# Patient Record
Sex: FEMALE | Race: BLACK OR AFRICAN AMERICAN | NOT HISPANIC OR LATINO | URBAN - METROPOLITAN AREA
[De-identification: names, ages, dates, MRNs, and addresses within clinical notes are randomized per-mention and may not be internally consistent; named-entity substitution may affect disease eponyms.]

---

## 2018-05-01 ENCOUNTER — OUTPATIENT (OUTPATIENT)
Dept: OUTPATIENT SERVICES | Facility: HOSPITAL | Age: 53
LOS: 1 days | End: 2018-05-01
Payer: MEDICAID

## 2018-05-01 PROCEDURE — G9001: CPT

## 2018-05-14 ENCOUNTER — INPATIENT (INPATIENT)
Facility: HOSPITAL | Age: 53
LOS: 1 days | Discharge: HOME | DRG: 92 | End: 2018-05-16
Attending: PSYCHIATRY & NEUROLOGY | Admitting: PSYCHIATRY & NEUROLOGY
Payer: MEDICAID

## 2018-05-14 ENCOUNTER — TRANSCRIPTION ENCOUNTER (OUTPATIENT)
Age: 53
End: 2018-05-14

## 2018-05-14 ENCOUNTER — EMERGENCY (EMERGENCY)
Facility: HOSPITAL | Age: 53
LOS: 1 days | End: 2018-05-14
Attending: EMERGENCY MEDICINE
Payer: MEDICAID

## 2018-05-14 VITALS
HEART RATE: 73 BPM | TEMPERATURE: 99 F | SYSTOLIC BLOOD PRESSURE: 113 MMHG | RESPIRATION RATE: 16 BRPM | OXYGEN SATURATION: 100 % | DIASTOLIC BLOOD PRESSURE: 56 MMHG

## 2018-05-14 VITALS
DIASTOLIC BLOOD PRESSURE: 60 MMHG | OXYGEN SATURATION: 100 % | HEIGHT: 62 IN | HEART RATE: 62 BPM | SYSTOLIC BLOOD PRESSURE: 130 MMHG | TEMPERATURE: 98 F | RESPIRATION RATE: 14 BRPM | WEIGHT: 169.09 LBS

## 2018-05-14 VITALS
WEIGHT: 162.26 LBS | RESPIRATION RATE: 17 BRPM | HEART RATE: 64 BPM | OXYGEN SATURATION: 100 % | SYSTOLIC BLOOD PRESSURE: 128 MMHG | DIASTOLIC BLOOD PRESSURE: 60 MMHG

## 2018-05-14 DIAGNOSIS — Z90.710 ACQUIRED ABSENCE OF BOTH CERVIX AND UTERUS: Chronic | ICD-10-CM

## 2018-05-14 DIAGNOSIS — R51 HEADACHE: ICD-10-CM

## 2018-05-14 LAB
ALBUMIN SERPL ELPH-MCNC: 3.7 G/DL — SIGNIFICANT CHANGE UP (ref 3.3–5)
ALBUMIN SERPL ELPH-MCNC: 4.2 G/DL — SIGNIFICANT CHANGE UP (ref 3.3–5)
ALP SERPL-CCNC: 63 U/L — SIGNIFICANT CHANGE UP (ref 40–120)
ALP SERPL-CCNC: 75 U/L — SIGNIFICANT CHANGE UP (ref 40–120)
ALT FLD-CCNC: 15 U/L — SIGNIFICANT CHANGE UP (ref 10–45)
ALT FLD-CCNC: 20 U/L — SIGNIFICANT CHANGE UP (ref 12–78)
ANION GAP SERPL CALC-SCNC: 13 MMOL/L — SIGNIFICANT CHANGE UP (ref 5–17)
ANION GAP SERPL CALC-SCNC: 9 MMOL/L — SIGNIFICANT CHANGE UP (ref 5–17)
APTT BLD: 25.7 SEC — LOW (ref 27.5–37.4)
APTT BLD: 34.1 SEC — SIGNIFICANT CHANGE UP (ref 27.5–37.4)
AST SERPL-CCNC: 12 U/L — LOW (ref 15–37)
AST SERPL-CCNC: 22 U/L — SIGNIFICANT CHANGE UP (ref 10–40)
BASOPHILS # BLD AUTO: 0.1 K/UL — SIGNIFICANT CHANGE UP (ref 0–0.2)
BASOPHILS NFR BLD AUTO: 1.3 % — SIGNIFICANT CHANGE UP (ref 0–2)
BILIRUB SERPL-MCNC: 0.5 MG/DL — SIGNIFICANT CHANGE UP (ref 0.2–1.2)
BILIRUB SERPL-MCNC: 0.6 MG/DL — SIGNIFICANT CHANGE UP (ref 0.2–1.2)
BLD GP AB SCN SERPL QL: NEGATIVE — SIGNIFICANT CHANGE UP
BUN SERPL-MCNC: 11 MG/DL — SIGNIFICANT CHANGE UP (ref 7–23)
BUN SERPL-MCNC: 9 MG/DL — SIGNIFICANT CHANGE UP (ref 7–23)
CALCIUM SERPL-MCNC: 8.6 MG/DL — SIGNIFICANT CHANGE UP (ref 8.5–10.1)
CALCIUM SERPL-MCNC: 9 MG/DL — SIGNIFICANT CHANGE UP (ref 8.4–10.5)
CHLORIDE SERPL-SCNC: 103 MMOL/L — SIGNIFICANT CHANGE UP (ref 96–108)
CHLORIDE SERPL-SCNC: 104 MMOL/L — SIGNIFICANT CHANGE UP (ref 96–108)
CO2 SERPL-SCNC: 22 MMOL/L — SIGNIFICANT CHANGE UP (ref 22–31)
CO2 SERPL-SCNC: 27 MMOL/L — SIGNIFICANT CHANGE UP (ref 22–31)
CREAT SERPL-MCNC: 0.66 MG/DL — SIGNIFICANT CHANGE UP (ref 0.5–1.3)
CREAT SERPL-MCNC: 0.76 MG/DL — SIGNIFICANT CHANGE UP (ref 0.5–1.3)
EOSINOPHIL # BLD AUTO: 0 K/UL — SIGNIFICANT CHANGE UP (ref 0–0.5)
EOSINOPHIL NFR BLD AUTO: 0 % — SIGNIFICANT CHANGE UP (ref 0–6)
ERYTHROCYTE [SEDIMENTATION RATE] IN BLOOD: 10 MM/HR — SIGNIFICANT CHANGE UP (ref 0–20)
GLUCOSE SERPL-MCNC: 128 MG/DL — HIGH (ref 70–99)
GLUCOSE SERPL-MCNC: 149 MG/DL — HIGH (ref 70–99)
HCT VFR BLD CALC: 39.3 % — SIGNIFICANT CHANGE UP (ref 34.5–45)
HCT VFR BLD CALC: 39.7 % — SIGNIFICANT CHANGE UP (ref 34.5–45)
HGB BLD-MCNC: 13 G/DL — SIGNIFICANT CHANGE UP (ref 11.5–15.5)
HGB BLD-MCNC: 13.2 G/DL — SIGNIFICANT CHANGE UP (ref 11.5–15.5)
INR BLD: 1.24 RATIO — HIGH (ref 0.88–1.16)
INR BLD: 1.96 RATIO — HIGH (ref 0.88–1.16)
LYMPHOCYTES # BLD AUTO: 1.2 K/UL — SIGNIFICANT CHANGE UP (ref 1–3.3)
LYMPHOCYTES # BLD AUTO: 15 % — SIGNIFICANT CHANGE UP (ref 13–44)
MCHC RBC-ENTMCNC: 29.8 PG — SIGNIFICANT CHANGE UP (ref 27–34)
MCHC RBC-ENTMCNC: 31 PG — SIGNIFICANT CHANGE UP (ref 27–34)
MCHC RBC-ENTMCNC: 33.2 GM/DL — SIGNIFICANT CHANGE UP (ref 32–36)
MCHC RBC-ENTMCNC: 33.3 GM/DL — SIGNIFICANT CHANGE UP (ref 32–36)
MCV RBC AUTO: 90 FL — SIGNIFICANT CHANGE UP (ref 80–100)
MCV RBC AUTO: 93.1 FL — SIGNIFICANT CHANGE UP (ref 80–100)
MONOCYTES # BLD AUTO: 0.3 K/UL — SIGNIFICANT CHANGE UP (ref 0–0.9)
MONOCYTES NFR BLD AUTO: 3.7 % — SIGNIFICANT CHANGE UP (ref 1–9)
NEUTROPHILS # BLD AUTO: 6.5 K/UL — SIGNIFICANT CHANGE UP (ref 1.8–7.4)
NEUTROPHILS NFR BLD AUTO: 80 % — HIGH (ref 43–77)
PLATELET # BLD AUTO: 251 K/UL — SIGNIFICANT CHANGE UP (ref 150–400)
PLATELET # BLD AUTO: 251 K/UL — SIGNIFICANT CHANGE UP (ref 150–400)
POTASSIUM SERPL-MCNC: 4 MMOL/L — SIGNIFICANT CHANGE UP (ref 3.5–5.3)
POTASSIUM SERPL-MCNC: 4.3 MMOL/L — SIGNIFICANT CHANGE UP (ref 3.5–5.3)
POTASSIUM SERPL-SCNC: 4 MMOL/L — SIGNIFICANT CHANGE UP (ref 3.5–5.3)
POTASSIUM SERPL-SCNC: 4.3 MMOL/L — SIGNIFICANT CHANGE UP (ref 3.5–5.3)
PROT SERPL-MCNC: 7.3 G/DL — SIGNIFICANT CHANGE UP (ref 6–8.3)
PROT SERPL-MCNC: 7.7 G/DL — SIGNIFICANT CHANGE UP (ref 6–8.3)
PROTHROM AB SERPL-ACNC: 13.6 SEC — HIGH (ref 9.8–12.7)
PROTHROM AB SERPL-ACNC: 21.7 SEC — HIGH (ref 9.8–12.7)
RBC # BLD: 4.27 M/UL — SIGNIFICANT CHANGE UP (ref 3.8–5.2)
RBC # BLD: 4.37 M/UL — SIGNIFICANT CHANGE UP (ref 3.8–5.2)
RBC # FLD: 12.1 % — SIGNIFICANT CHANGE UP (ref 10.3–14.5)
RBC # FLD: 12.5 % — SIGNIFICANT CHANGE UP (ref 10.3–14.5)
RH IG SCN BLD-IMP: POSITIVE — SIGNIFICANT CHANGE UP
SODIUM SERPL-SCNC: 138 MMOL/L — SIGNIFICANT CHANGE UP (ref 135–145)
SODIUM SERPL-SCNC: 140 MMOL/L — SIGNIFICANT CHANGE UP (ref 135–145)
WBC # BLD: 8.1 K/UL — SIGNIFICANT CHANGE UP (ref 3.8–10.5)
WBC # BLD: 8.6 K/UL — SIGNIFICANT CHANGE UP (ref 3.8–10.5)
WBC # FLD AUTO: 8.1 K/UL — SIGNIFICANT CHANGE UP (ref 3.8–10.5)
WBC # FLD AUTO: 8.6 K/UL — SIGNIFICANT CHANGE UP (ref 3.8–10.5)

## 2018-05-14 PROCEDURE — 85652 RBC SED RATE AUTOMATED: CPT

## 2018-05-14 PROCEDURE — 99284 EMERGENCY DEPT VISIT MOD MDM: CPT

## 2018-05-14 PROCEDURE — 70450 CT HEAD/BRAIN W/O DYE: CPT | Mod: 26

## 2018-05-14 PROCEDURE — 70450 CT HEAD/BRAIN W/O DYE: CPT

## 2018-05-14 PROCEDURE — 85027 COMPLETE CBC AUTOMATED: CPT

## 2018-05-14 PROCEDURE — 99285 EMERGENCY DEPT VISIT HI MDM: CPT

## 2018-05-14 PROCEDURE — 80053 COMPREHEN METABOLIC PANEL: CPT

## 2018-05-14 PROCEDURE — 99285 EMERGENCY DEPT VISIT HI MDM: CPT | Mod: 25

## 2018-05-14 PROCEDURE — 96360 HYDRATION IV INFUSION INIT: CPT

## 2018-05-14 RX ORDER — METOCLOPRAMIDE HCL 10 MG
10 TABLET ORAL ONCE
Qty: 0 | Refills: 0 | Status: COMPLETED | OUTPATIENT
Start: 2018-05-14 | End: 2018-05-14

## 2018-05-14 RX ORDER — ENOXAPARIN SODIUM 100 MG/ML
70 INJECTION SUBCUTANEOUS
Qty: 0 | Refills: 0 | Status: DISCONTINUED | OUTPATIENT
Start: 2018-05-15 | End: 2018-05-16

## 2018-05-14 RX ORDER — ENOXAPARIN SODIUM 100 MG/ML
70 INJECTION SUBCUTANEOUS ONCE
Qty: 0 | Refills: 0 | Status: COMPLETED | OUTPATIENT
Start: 2018-05-14 | End: 2018-05-14

## 2018-05-14 RX ORDER — SODIUM CHLORIDE 9 MG/ML
1000 INJECTION INTRAMUSCULAR; INTRAVENOUS; SUBCUTANEOUS ONCE
Qty: 0 | Refills: 0 | Status: COMPLETED | OUTPATIENT
Start: 2018-05-14 | End: 2018-05-14

## 2018-05-14 RX ORDER — SODIUM CHLORIDE 9 MG/ML
3 INJECTION INTRAMUSCULAR; INTRAVENOUS; SUBCUTANEOUS ONCE
Qty: 0 | Refills: 0 | Status: COMPLETED | OUTPATIENT
Start: 2018-05-14 | End: 2018-05-14

## 2018-05-14 RX ORDER — ACETAMINOPHEN 500 MG
1000 TABLET ORAL ONCE
Qty: 0 | Refills: 0 | Status: COMPLETED | OUTPATIENT
Start: 2018-05-14 | End: 2018-05-14

## 2018-05-14 RX ADMIN — Medication 10 MILLIGRAM(S): at 15:02

## 2018-05-14 RX ADMIN — SODIUM CHLORIDE 1000 MILLILITER(S): 9 INJECTION INTRAMUSCULAR; INTRAVENOUS; SUBCUTANEOUS at 11:16

## 2018-05-14 RX ADMIN — Medication 400 MILLIGRAM(S): at 15:01

## 2018-05-14 RX ADMIN — Medication 1000 MILLIGRAM(S): at 15:02

## 2018-05-14 RX ADMIN — SODIUM CHLORIDE 3 MILLILITER(S): 9 INJECTION INTRAMUSCULAR; INTRAVENOUS; SUBCUTANEOUS at 11:15

## 2018-05-14 RX ADMIN — ENOXAPARIN SODIUM 70 MILLIGRAM(S): 100 INJECTION SUBCUTANEOUS at 18:18

## 2018-05-14 NOTE — ED PROVIDER NOTE - PROGRESS NOTE DETAILS
Dw pt, doing well, awake, alert - still neuro intact.   Jonathan [facility] - Dr Mejia Neurosurg, Dr Mayfield ED - accepts [facility]

## 2018-05-14 NOTE — ED PROVIDER NOTE - ATTENDING CONTRIBUTION TO CARE
Pt transferred from hospitals ER for headache for one day with DVST seen on CTH.  No focal neuro deficit, no trauma, appears well.

## 2018-05-14 NOTE — ED PROVIDER NOTE - OBJECTIVE STATEMENT
54 yo F p/w gen headache x past ~ 2 days. Frontal discomcort, assoc with some nausea. No vomiting . No fever/chills. No recent illness. no cough /sputum / nasal congestion,. No recent trauma. no neck / back pain or stiffness. No agg/allev factors. No rad of pain. NO agg/allev factors. No visual changes. no other inj or co.

## 2018-05-14 NOTE — ED ADULT NURSE REASSESSMENT NOTE - NS ED NURSE REASSESS COMMENT FT1
Pt head CT resulted in posterior bleed. Pt being transferred to [place], handoff report given to Jarrod INIGUEZ at the transfer center. Pt is AxOx4 with no neuro deficits, awaiting EMS for transfer

## 2018-05-14 NOTE — ED ADULT NURSE REASSESSMENT NOTE - NS ED NURSE REASSESS COMMENT FT1
neuro check sone- no deficits noted, full rom x4 + strength in all 4 extremities no drift no facial deficits no slurred speech.  PERRLA pt denies any blurry vision, denies any photophobia. denies any nausea, vomiting, respiration even unlabored. plan of care explained to patient. will continue to monitor for safety and to re-assess. safety and support provided. neuro check done- no deficits noted, full rom x4 + strength in all 4 extremities no drift no facial deficits no slurred speech.  PERRLA pt denies any blurry vision, denies any photophobia. denies any nausea, vomiting, respiration even unlabored. plan of care explained to patient. will continue to monitor for safety and to re-assess. safety and support provided.

## 2018-05-14 NOTE — ED ADULT NURSE REASSESSMENT NOTE - NS ED NURSE REASSESS COMMENT FT1
19:10. Report received from HIRA Carey. Pt AAOx4, NAD, resp nonlabored, skin warm/dry, resting comfortably in bed. A&Ox3. PERRL. No facial droop noted. No slurred speech. Pt upper and lower extremities bilateral in strength. Pt denies dizziness, blurred vision, weakness, numbness/tingling, chest pain, SOB. n/v, abdominal pain, fevers, & chills. Pt awaiting bed upstairs. Safety & comfort measures maintained. Will continue to reassess.
A&Ox3. PERRL. No facial droop noted. No slurred speech. Pt upper and lower extremities bilateral in strength. Pt denies dizziness, blurred vision, weakness, numbness/tingling, chest pain, SOB. n/v, abdominal pain, fevers, & chills. Report given to HIRA Lake, in ED holding. Pt awaiting bed upstairs at this time.

## 2018-05-14 NOTE — H&P ADULT - HISTORY OF PRESENT ILLNESS
53F no significant PMH transferred from [place] for subdural hematoma vs. sinus thrombosis. Patient states she developed frontal headache associated with nausea/vomiting yesterday. She took acetaminophen without relief. CT head at [facility] showing "acute posterior interhemispheric and left occipital convexity subdural hematoma vs. sinus thrombosis. Her NIHSS is 0 and MRS is 0. She denied weakness, numbness, change in speech and vision, dizziness. She denied photophobia/phonophobia.

## 2018-05-14 NOTE — ED PROVIDER NOTE - ENMT, MLM
Airway patent, Nasal mucosa clear. Mouth with normal mucosa. Throat has no vesicles, no oropharyngeal exudates and uvula is midline. Neck supple. no meningeal signs.

## 2018-05-14 NOTE — ED ADULT NURSE NOTE - CHPI ED SYMPTOMS NEG
no confusion/no change in level of consciousness/no numbness/no fever/no loss of consciousness/no blurred vision/no weakness

## 2018-05-14 NOTE — ED PROVIDER NOTE - CHPI ED SYMPTOMS NEG
no confusion/no vomiting/no dizziness/no numbness/no fever/no loss of consciousness/no change in level of consciousness/no weakness/no blurred vision

## 2018-05-14 NOTE — ED PROVIDER NOTE - OBJECTIVE STATEMENT
53F no significant PMH transferred from [place] for subdural hematoma vs. sinus thrombosis. Patient states she developed frontal headache associated with nausea/vomiting yesterday. She took acetaminophen every 6 hours without relief of pain, she then went to urgent care, who sent her to ED for further eval. CT head at [facility] showing "acute posterior interhemispheric and left occipital convexity subdural hematoma vs. sinus thrombosis". Patient transferred to [facility] for neurosurgical eval.     PCP: Keiko

## 2018-05-14 NOTE — ED PROVIDER NOTE - MEDICAL DECISION MAKING DETAILS
Resident Catrina: 53F no sig PMH p/w headache transferred from [place] for r/o sinus thrombosis vs. SDH. Will get CT head w/ IV contrast per neurosurgical recs.

## 2018-05-14 NOTE — ED ADULT NURSE NOTE - OBJECTIVE STATEMENT
53 yr old female was having a bad headache yesterday that wasn't going away. she went to urgent care who sent her to the ER and they showed either a sdh or maybe a clot in the brain. on assessment a and o x 3 lungs clear abd soft non tender no swelling in extremities no n/v/d/ no fevers, no neuro deficits .can walk

## 2018-05-14 NOTE — H&P ADULT - ATTENDING COMMENTS
I have seen and examined this patient with the stroke neurology team.     History was reviewed with the patient.   ROS: All negative except documented in the HPI.   Neurological exam was performed and agree with exam as documented above.   Laboratory results and imaging studies were reviewed by me.   I agree with the neurology resident note as documented above.    54 Y/O woman with vascular risk factor of age is evaluated at [facility] for headaches. She reports to have noticed HAs since about 5/10, described as holocephalic HAs associated with nausea and vomiting. She denies any significant history of HAs in the past. She was initially presented to urgent care center and was referred to OSH for further evaluation. She was transferred to [facility] for further management of abnormal CT brain findings. Neurological exam today is grossly non-focal. CT brain on admission showed hyperdensity involving left SSS, transverse sinus, sigmoid sinus, IJ and straight sinus. CTV head confirmed extensive cerebral venous sinus thrombosis involving posterior superior sagittal sinus, torcula, bilateral transverse sinuses (left worse than right), sigmoid sinus, IJ and straight sinus. Of note, she denies any personal or family history of DVT/PEs or history of miscarriages in the past.     Impression:  Spontaneous/unprovoked extensive cerebral venous thrombosis    Plan:  - Continue with therapeutic anticoagulation with subcutaneous Lovenox and consider switching to warfarin with goal INR 2-3 as outpatient. Duration of therapeutic indicative patient is expected to be 6 months considering unprovoked cerebral venous sinus thrombosis  - Hypercoagulable workup; hematologist evaluation as outpatient  - Mixing studies due to elevated INR on admission  - BP goal<140/90 mmHg  - MRI of brain with and without contrast   - Repeat MRI brain/MRV head with and without contrast in about 6 months  - PT/OT/speech and swallowing evaluation    Above-mentioned plan is discussed with patient in detail. All the questions were answered and concerns were addressed.

## 2018-05-14 NOTE — CONSULT NOTE ADULT - ASSESSMENT
53F no signifincat pmhx not on any medications here with one days of headache n/v found to have SDH vs venous sinus thrombosis on CTH  - No acute neurosurgical intervention  - Keppra 500BID, load with 1g  - CTH / CTV  - Neurology eval if positive for venous sinus thrombosis   - Neurochecks

## 2018-05-14 NOTE — H&P ADULT - NSHPLABSRESULTS_GEN_ALL_CORE
13.2   8.6   )-----------( 251      ( 14 May 2018 15:35 )             39.7   05-14    138  |  103  |  9   ----------------------------<  128<H>  4.3   |  22  |  0.66    Ca    9.0      14 May 2018 15:35    TPro  7.3  /  Alb  4.2  /  TBili  0.5  /  DBili  x   /  AST  22  /  ALT  15  /  AlkPhos  63  05-14

## 2018-05-14 NOTE — H&P ADULT - ASSESSMENT
53F no significant PMH transferred from [place] for subdural hematoma vs. sinus thrombosis. Patient states she developed frontal headache associated with nausea/vomiting yesterday. She took acetaminophen without relief. CT head at [facility] showing "acute posterior interhemispheric and left occipital convexity subdural hematoma vs. sinus thrombosis. Her NIHSS is 0 and MRS is 0. She denied weakness, numbness, change in speech and vision, dizziness. She denied photophobia/phonophobia. Neurological examination was unremarkable.     Impression 53F no significant PMH transferred from [place] for subdural hematoma vs. sinus thrombosis. Patient states she developed frontal headache associated with nausea/vomiting yesterday. She took acetaminophen without relief. CT head at [facility] showing "acute posterior interhemispheric and left occipital convexity subdural hematoma vs. sinus thrombosis. Her NIHSS is 0 and MRS is 0. She denied weakness, numbness, change in speech and vision, dizziness. She denied photophobia/phonophobia. Neurological examination was unremarkable.     Impression     Extensive venous sinus thrombosis     Plan     Enoxaparin 70 BID (therapeutic dose)   vitals monitoring   neuro checks   PT   coumadin would be started based on patient's preference and insurance coverage tomorrow

## 2018-05-14 NOTE — ED PROVIDER NOTE - PROGRESS NOTE DETAILS
Resident Catrina: Neurosurgery resident at bedside, already reviewed CTH from [facility], requesting CTH w/ IV contrast to r/o thrombosis. Dr. Hernández Note: neuro consulted, awaiting ct venogram, will hold treatment til imaging results and neuro reccs.  Also noted was elevated INR, will repeat.

## 2018-05-15 DIAGNOSIS — R69 ILLNESS, UNSPECIFIED: ICD-10-CM

## 2018-05-15 LAB
APTT BLD: 32.3 SEC — SIGNIFICANT CHANGE UP (ref 27.5–37.4)
ESTIMATED AVERAGE GLUCOSE: 120 MG/DL — HIGH (ref 68–114)
FIBRINOGEN PPP-MCNC: 434 MG/DL — SIGNIFICANT CHANGE UP (ref 310–510)
HBA1C BLD-MCNC: 5.8 % — HIGH (ref 4–5.6)
HBA1C BLD-MCNC: 5.8 % — HIGH (ref 4–5.6)
INR BLD: 1.19 RATIO — HIGH (ref 0.88–1.16)
PROTHROM AB SERPL-ACNC: 12.9 SEC — HIGH (ref 9.8–12.7)
PT 100%: 12.9 SEC — HIGH (ref 9.8–12.7)
PT 50/50: 12.3 SEC — SIGNIFICANT CHANGE UP (ref 9.7–15.2)
REPTILASE TIME: 18.4 SEC — SIGNIFICANT CHANGE UP (ref 15–20.5)
THROMBIN TIME: 26.5 SECS — HIGH (ref 16–25)

## 2018-05-15 PROCEDURE — 99223 1ST HOSP IP/OBS HIGH 75: CPT | Mod: GC

## 2018-05-15 PROCEDURE — G0452: CPT | Mod: 26

## 2018-05-15 RX ORDER — SODIUM CHLORIDE 9 MG/ML
1000 INJECTION INTRAMUSCULAR; INTRAVENOUS; SUBCUTANEOUS
Qty: 0 | Refills: 0 | Status: DISCONTINUED | OUTPATIENT
Start: 2018-05-15 | End: 2018-05-16

## 2018-05-15 RX ORDER — ACETAMINOPHEN 500 MG
650 TABLET ORAL EVERY 6 HOURS
Qty: 0 | Refills: 0 | Status: DISCONTINUED | OUTPATIENT
Start: 2018-05-15 | End: 2018-05-16

## 2018-05-15 RX ADMIN — ENOXAPARIN SODIUM 70 MILLIGRAM(S): 100 INJECTION SUBCUTANEOUS at 05:16

## 2018-05-15 RX ADMIN — Medication 650 MILLIGRAM(S): at 17:35

## 2018-05-15 RX ADMIN — ENOXAPARIN SODIUM 70 MILLIGRAM(S): 100 INJECTION SUBCUTANEOUS at 17:33

## 2018-05-16 ENCOUNTER — TRANSCRIPTION ENCOUNTER (OUTPATIENT)
Age: 53
End: 2018-05-16

## 2018-05-16 VITALS — HEART RATE: 56 BPM | SYSTOLIC BLOOD PRESSURE: 118 MMHG | DIASTOLIC BLOOD PRESSURE: 64 MMHG | OXYGEN SATURATION: 97 %

## 2018-05-16 DIAGNOSIS — E87.6 HYPOKALEMIA: ICD-10-CM

## 2018-05-16 DIAGNOSIS — Q21.1 ATRIAL SEPTAL DEFECT: ICD-10-CM

## 2018-05-16 LAB
ANION GAP SERPL CALC-SCNC: 14 MMOL/L — SIGNIFICANT CHANGE UP (ref 5–17)
APTT BLD: 29.3 SEC — SIGNIFICANT CHANGE UP (ref 27.5–37.4)
AT III ACT/NOR PPP CHRO: 93 % — SIGNIFICANT CHANGE UP (ref 85–135)
B2 GLYCOPROT1 AB SER QL: NEGATIVE — SIGNIFICANT CHANGE UP
BUN SERPL-MCNC: 8 MG/DL — SIGNIFICANT CHANGE UP (ref 7–23)
CALCIUM SERPL-MCNC: 8.3 MG/DL — LOW (ref 8.4–10.5)
CARDIOLIPIN AB SER-ACNC: NEGATIVE — SIGNIFICANT CHANGE UP
CHLORIDE SERPL-SCNC: 102 MMOL/L — SIGNIFICANT CHANGE UP (ref 96–108)
CHOLEST SERPL-MCNC: 150 MG/DL — SIGNIFICANT CHANGE UP (ref 10–199)
CO2 SERPL-SCNC: 26 MMOL/L — SIGNIFICANT CHANGE UP (ref 22–31)
CREAT SERPL-MCNC: 0.63 MG/DL — SIGNIFICANT CHANGE UP (ref 0.5–1.3)
GLUCOSE SERPL-MCNC: 116 MG/DL — HIGH (ref 70–99)
HCG SERPL-ACNC: <2 MIU/ML — LOW (ref 5–24)
HCT VFR BLD CALC: 37.3 % — SIGNIFICANT CHANGE UP (ref 34.5–45)
HCYS SERPL-MCNC: 6.5 UMOL/L — SIGNIFICANT CHANGE UP (ref 5–15)
HDLC SERPL-MCNC: 48 MG/DL — SIGNIFICANT CHANGE UP (ref 40–125)
HGB BLD-MCNC: 12.4 G/DL — SIGNIFICANT CHANGE UP (ref 11.5–15.5)
INR BLD: 1.09 RATIO — SIGNIFICANT CHANGE UP (ref 0.88–1.16)
LIPID PNL WITH DIRECT LDL SERPL: 80 MG/DL — SIGNIFICANT CHANGE UP
MCHC RBC-ENTMCNC: 30.1 PG — SIGNIFICANT CHANGE UP (ref 27–34)
MCHC RBC-ENTMCNC: 33.2 GM/DL — SIGNIFICANT CHANGE UP (ref 32–36)
MCV RBC AUTO: 90.5 FL — SIGNIFICANT CHANGE UP (ref 80–100)
PLATELET # BLD AUTO: 264 K/UL — SIGNIFICANT CHANGE UP (ref 150–400)
POTASSIUM SERPL-MCNC: 3.4 MMOL/L — LOW (ref 3.5–5.3)
POTASSIUM SERPL-SCNC: 3.4 MMOL/L — LOW (ref 3.5–5.3)
PROT C ACT/NOR PPP: 100 % — SIGNIFICANT CHANGE UP (ref 74–150)
PROT S FREE AG PPP IA-ACNC: 86 % — SIGNIFICANT CHANGE UP (ref 61–131)
PROTHROM AB SERPL-ACNC: 12.3 SEC — SIGNIFICANT CHANGE UP (ref 10–13.1)
RBC # BLD: 4.12 M/UL — SIGNIFICANT CHANGE UP (ref 3.8–5.2)
RBC # FLD: 13.4 % — SIGNIFICANT CHANGE UP (ref 10.3–14.5)
SODIUM SERPL-SCNC: 142 MMOL/L — SIGNIFICANT CHANGE UP (ref 135–145)
TOTAL CHOLESTEROL/HDL RATIO MEASUREMENT: 3.1 RATIO — LOW (ref 3.3–7.1)
TRIGL SERPL-MCNC: 110 MG/DL — SIGNIFICANT CHANGE UP (ref 10–149)
WBC # BLD: 6.04 K/UL — SIGNIFICANT CHANGE UP (ref 3.8–10.5)
WBC # FLD AUTO: 6.04 K/UL — SIGNIFICANT CHANGE UP (ref 3.8–10.5)

## 2018-05-16 PROCEDURE — 99233 SBSQ HOSP IP/OBS HIGH 50: CPT

## 2018-05-16 RX ORDER — SODIUM CHLORIDE 9 MG/ML
1000 INJECTION INTRAMUSCULAR; INTRAVENOUS; SUBCUTANEOUS
Qty: 0 | Refills: 0 | Status: DISCONTINUED | OUTPATIENT
Start: 2018-05-16 | End: 2018-05-16

## 2018-05-16 RX ORDER — ENOXAPARIN SODIUM 100 MG/ML
70 INJECTION SUBCUTANEOUS
Qty: 4200 | Refills: 0 | OUTPATIENT
Start: 2018-05-16 | End: 2018-06-14

## 2018-05-16 RX ORDER — RIBOFLAVIN (VITAMIN B2) 25 MG
1 TABLET ORAL
Qty: 30 | Refills: 0 | OUTPATIENT
Start: 2018-05-16 | End: 2018-06-14

## 2018-05-16 RX ORDER — POTASSIUM CHLORIDE 20 MEQ
40 PACKET (EA) ORAL ONCE
Qty: 0 | Refills: 0 | Status: COMPLETED | OUTPATIENT
Start: 2018-05-16 | End: 2018-05-16

## 2018-05-16 RX ORDER — RIBOFLAVIN (VITAMIN B2) 25 MG
1 TABLET ORAL
Qty: 30 | Refills: 0 | OUTPATIENT
Start: 2018-05-16

## 2018-05-16 RX ADMIN — Medication 650 MILLIGRAM(S): at 01:10

## 2018-05-16 RX ADMIN — Medication 40 MILLIEQUIVALENT(S): at 13:51

## 2018-05-16 RX ADMIN — Medication 650 MILLIGRAM(S): at 12:50

## 2018-05-16 RX ADMIN — Medication 650 MILLIGRAM(S): at 11:50

## 2018-05-16 RX ADMIN — ENOXAPARIN SODIUM 70 MILLIGRAM(S): 100 INJECTION SUBCUTANEOUS at 05:16

## 2018-05-16 NOTE — CONSULT NOTE ADULT - ASSESSMENT
53F no significant PMH transferred from [place] for subdural hematoma vs. sinus thrombosis. Patient states she developed frontal headache associated with nausea/vomiting yesterday. She took acetaminophen without relief. CT head at [facility] showing "acute posterior interhemispheric and left occipital convexity subdural hematoma vs. sinus thrombosis.

## 2018-05-16 NOTE — PROGRESS NOTE ADULT - SUBJECTIVE AND OBJECTIVE BOX
54 y/o Female with vascular risk factor of age is evaluated at [facility] for headaches. She reports to have noticed HAs since about 5/10, described as holocephalic HAs associated with nausea and vomiting. She denies any significant history of HAs in the past. She was transferred to [facility] for further management of abnormal CT brain findings.     SUBJECTIVE: No events overnight.  No new neurologic complaints.      acetaminophen   Tablet. 650 milliGRAM(s) Oral every 6 hours PRN  enoxaparin Injectable 70 milliGRAM(s) SubCutaneous two times a day  sodium chloride 0.9%. 1000 milliLiter(s) IV Continuous <Continuous>    PHYSICAL EXAM:   Vital Signs Last 24 Hrs  T(C): 36.9 (16 May 2018 08:18), Max: 37.2 (15 May 2018 23:15)  T(F): 98.4 (16 May 2018 08:18), Max: 99 (15 May 2018 23:15)  HR: 56 (16 May 2018 08:18) (52 - 63)  BP: 101/64 (16 May 2018 08:18) (101/64 - 127/80)  RR: 18 (16 May 2018 08:18) (17 - 18)  SpO2: 99% (16 May 2018 08:18) (97% - 99%)    General: No acute distress  HEENT: EOM intact, visual fields full  Abdomen: Soft, nontender, nondistended   Extremities: No edema    NEUROLOGICAL EXAM:  Mental status: Awake, alert, oriented x3, no aphasia, no neglect, normal memory   Cranial Nerves: No facial asymmetry, no nystagmus, no dysarthria,  tongue midline  Motor exam: Normal tone, no drift, 5/5 RUE, 5/5 RLE, 5/5 LUE, 5/5 LLE, normal fine finger movements.  Sensation: Intact to light touch   Coordination/ Gait: No dysmetria, LUIS intact and symmetric bilaterally    LABS:                        12.4   6.04  )-----------( 264      ( 16 May 2018 07:54 )             37.3    05-16    142  |  102  |  8   ----------------------------<  116<H>  3.4<L>   |  26  |  0.63    Ca    8.3<L>      16 May 2018 06:19    TPro  7.3  /  Alb  4.2  /  TBili  0.5  /  DBili  x   /  AST  22  /  ALT  15  /  AlkPhos  63  05-14  PT/INR - ( 16 May 2018 07:41 )   PT: 12.3 sec;   INR: 1.09 ratio         PTT - ( 16 May 2018 07:41 )  PTT:29.3 sec  Hemoglobin A1C, Whole Blood: 5.8 % (05-15 @ 16:24)  Hemoglobin A1C, Whole Blood: 5.8 % (05-15 @ 16:24)      IMAGING: Reviewed by me.     Brain CT 5/15/17  Extensive dural sinus thrombosis.    Brain CTA 5/15/17  Extensive dural sinus thrombosis involving the left transverse   and sigmoid sinuses, left jugular bulb, torcula, inferior aspect of the   superior sagittal sinus and straight sinus. 54 y/o Female with vascular risk factor of age is evaluated at [facility] for headaches. She reports to have noticed HAs since about 5/10, described as holocephalic HAs associated with nausea and vomiting. She denies any significant history of HAs in the past. She was transferred to [facility] for further management of abnormal CT brain findings.     SUBJECTIVE: No events overnight.  No new neurologic complaints.      acetaminophen   Tablet. 650 milliGRAM(s) Oral every 6 hours PRN  enoxaparin Injectable 70 milliGRAM(s) SubCutaneous two times a day  sodium chloride 0.9%. 1000 milliLiter(s) IV Continuous <Continuous>    PHYSICAL EXAM:   Vital Signs Last 24 Hrs  T(C): 36.9 (16 May 2018 08:18), Max: 37.2 (15 May 2018 23:15)  T(F): 98.4 (16 May 2018 08:18), Max: 99 (15 May 2018 23:15)  HR: 56 (16 May 2018 08:18) (52 - 63)  BP: 101/64 (16 May 2018 08:18) (101/64 - 127/80)  RR: 18 (16 May 2018 08:18) (17 - 18)  SpO2: 99% (16 May 2018 08:18) (97% - 99%)    General: No acute distress  HEENT: EOM intact, visual fields full  Abdomen: Soft, nontender, nondistended   Extremities: No edema    NEUROLOGICAL EXAM:  Mental status: Awake, alert, oriented x3, no aphasia, no neglect, normal memory   Cranial Nerves: No facial asymmetry, no nystagmus, no dysarthria,  tongue midline  Motor exam: Normal tone, no drift, 5/5 RUE, 5/5 RLE, 5/5 LUE, 5/5 LLE, normal fine finger movements.  Sensation: Intact to light touch   Coordination/ Gait: No dysmetria    LABS:                        12.4   6.04  )-----------( 264      ( 16 May 2018 07:54 )             37.3    05-16    142  |  102  |  8   ----------------------------<  116<H>  3.4<L>   |  26  |  0.63    Ca    8.3<L>      16 May 2018 06:19    TPro  7.3  /  Alb  4.2  /  TBili  0.5  /  DBili  x   /  AST  22  /  ALT  15  /  AlkPhos  63  05-14  PT/INR - ( 16 May 2018 07:41 )   PT: 12.3 sec;   INR: 1.09 ratio         PTT - ( 16 May 2018 07:41 )  PTT:29.3 sec  Hemoglobin A1C, Whole Blood: 5.8 % (05-15 @ 16:24)  Hemoglobin A1C, Whole Blood: 5.8 % (05-15 @ 16:24)      IMAGING: Reviewed by me.     Brain CT 5/15/17  Extensive dural sinus thrombosis.    Brain CTA 5/15/17  Extensive dural sinus thrombosis involving the left transverse   and sigmoid sinuses, left jugular bulb, torcula, inferior aspect of the   superior sagittal sinus and straight sinus. 54 y/o Female with vascular risk factor of age is evaluated at [facility] for headaches. She reports to have noticed HAs since about 5/10, described as holocephalic HAs associated with nausea and vomiting. She denies any significant history of HAs in the past. She was transferred to [facility] for further management of abnormal CT brain findings.     SUBJECTIVE: No events overnight.  No new neurologic complaints.      acetaminophen   Tablet. 650 milliGRAM(s) Oral every 6 hours PRN  enoxaparin Injectable 70 milliGRAM(s) SubCutaneous two times a day  sodium chloride 0.9%. 1000 milliLiter(s) IV Continuous <Continuous>    PHYSICAL EXAM:   Vital Signs Last 24 Hrs  T(C): 36.9 (16 May 2018 08:18), Max: 37.2 (15 May 2018 23:15)  T(F): 98.4 (16 May 2018 08:18), Max: 99 (15 May 2018 23:15)  HR: 56 (16 May 2018 08:18) (52 - 63)  BP: 101/64 (16 May 2018 08:18) (101/64 - 127/80)  RR: 18 (16 May 2018 08:18) (17 - 18)  SpO2: 99% (16 May 2018 08:18) (97% - 99%)    General: No acute distress  HEENT: EOM intact, visual fields full  Abdomen: Soft, nontender, nondistended   Extremities: No edema    NEUROLOGICAL EXAM:  Mental status: Awake, alert, oriented x3, normal memory, follows simple and complex commands.  Cranial Nerves: No facial asymmetry, no nystagmus, no dysarthria,  tongue midline  Motor exam: Normal tone, no drift, 5/5 RUE, 5/5 RLE, 5/5 LUE, 5/5 LLE, normal fine finger movements.  Sensation: Intact to light touch   Coordination/ Gait: No dysmetria    LABS:                        12.4   6.04  )-----------( 264      ( 16 May 2018 07:54 )             37.3    05-16    142  |  102  |  8   ----------------------------<  116<H>  3.4<L>   |  26  |  0.63    Ca    8.3<L>      16 May 2018 06:19    TPro  7.3  /  Alb  4.2  /  TBili  0.5  /  DBili  x   /  AST  22  /  ALT  15  /  AlkPhos  63  05-14  PT/INR - ( 16 May 2018 07:41 )   PT: 12.3 sec;   INR: 1.09 ratio         PTT - ( 16 May 2018 07:41 )  PTT:29.3 sec  Hemoglobin A1C, Whole Blood: 5.8 % (05-15 @ 16:24)  Hemoglobin A1C, Whole Blood: 5.8 % (05-15 @ 16:24)      IMAGING: Reviewed by me.     Brain CT 5/15/17  Extensive dural sinus thrombosis.    Brain CTA 5/15/17  Extensive dural sinus thrombosis involving the left transverse   and sigmoid sinuses, left jugular bulb, torcula, inferior aspect of the   superior sagittal sinus and straight sinus. 54 y/o Female with vascular risk factor of age evaluated at [facility] for headaches. She reported to have noticed HAs since about 5/10, described as holocephalic HAs associated with nausea and vomiting. She denied any significant history of HAs in the past. She was transferred to [facility] for further management of abnormal CT brain findings.     SUBJECTIVE: No events overnight.  No new neurologic complaints.      acetaminophen   Tablet. 650 milliGRAM(s) Oral every 6 hours PRN  enoxaparin Injectable 70 milliGRAM(s) SubCutaneous two times a day  sodium chloride 0.9%. 1000 milliLiter(s) IV Continuous <Continuous>    PHYSICAL EXAM:   Vital Signs Last 24 Hrs  T(C): 36.9 (16 May 2018 08:18), Max: 37.2 (15 May 2018 23:15)  T(F): 98.4 (16 May 2018 08:18), Max: 99 (15 May 2018 23:15)  HR: 56 (16 May 2018 08:18) (52 - 63)  BP: 101/64 (16 May 2018 08:18) (101/64 - 127/80)  RR: 18 (16 May 2018 08:18) (17 - 18)  SpO2: 99% (16 May 2018 08:18) (97% - 99%)    General: No acute distress  HEENT: EOM intact, visual fields full  Abdomen: Soft, nontender, nondistended   Extremities: No edema    NEUROLOGICAL EXAM:  Mental status: Awake, alert, oriented x3, normal memory, follows simple and complex commands.  Cranial Nerves: No facial asymmetry, no nystagmus, no dysarthria,  tongue midline  Motor exam: Normal tone, no drift, 5/5 RUE, 5/5 RLE, 5/5 LUE, 5/5 LLE, normal fine finger movements.  Sensation: Intact to light touch   Coordination/ Gait: No dysmetria    LABS:                        12.4   6.04  )-----------( 264      ( 16 May 2018 07:54 )             37.3    05-16    142  |  102  |  8   ----------------------------<  116<H>  3.4<L>   |  26  |  0.63    Ca    8.3<L>      16 May 2018 06:19    TPro  7.3  /  Alb  4.2  /  TBili  0.5  /  DBili  x   /  AST  22  /  ALT  15  /  AlkPhos  63  05-14  PT/INR - ( 16 May 2018 07:41 )   PT: 12.3 sec;   INR: 1.09 ratio         PTT - ( 16 May 2018 07:41 )  PTT:29.3 sec  Hemoglobin A1C, Whole Blood: 5.8 % (05-15 @ 16:24)  Hemoglobin A1C, Whole Blood: 5.8 % (05-15 @ 16:24)      IMAGING: Reviewed by me.     Brain CT 5/15/17  Extensive dural sinus thrombosis.    Brain CTA 5/15/17  Extensive dural sinus thrombosis involving the left transverse   and sigmoid sinuses, left jugular bulb, torcula, inferior aspect of the   superior sagittal sinus and straight sinus.

## 2018-05-16 NOTE — PHYSICAL THERAPY INITIAL EVALUATION ADULT - DISCHARGE DISPOSITION, PT EVAL
no skilled PT needs/Home with no skilled PT needs, no AD recommendation. Assist of pt's spouse as needed. **Pt cleared for d/c home from PT perspective at this time./home

## 2018-05-16 NOTE — DISCHARGE NOTE ADULT - CARE PROVIDER_API CALL
Ad Mcfarland (BAO), Neurology; Vascular Neurology  [address]  [address]  [place] [address]  Phone: [phone]  Fax: [phone]    Pahlavan, Mohsen (MD), Nephrology  [address]  [place] [address]  Phone: [phone]  Fax: [phone]

## 2018-05-16 NOTE — DISCHARGE NOTE ADULT - CARE PROVIDERS DIRECT ADDRESSES
[email or web address]

## 2018-05-16 NOTE — PHYSICAL THERAPY INITIAL EVALUATION ADULT - PERTINENT HX OF CURRENT PROBLEM, REHAB EVAL
53F no significant PMH transferred from [place] for subdural hematoma vs. sinus thrombosis. Patient states she developed frontal headache associated with nausea/vomiting yesterday. She took acetaminophen without relief. CT head at [facility] showing "acute posterior interhemispheric and left occipital convexity subdural hematoma vs. sinus thrombosis. 53F no significant PMH transferred from [place] for subdural hematoma vs. sinus thrombosis. Patient states she developed frontal headache associated with nausea/vomiting yesterday. She took acetaminophen without relief. CT head at [facility] showing "acute posterior interhemispheric and left occipital convexity subdural hematoma vs. sinus thrombosis. CT head (5/14): extensive dural sinus thrombosis

## 2018-05-16 NOTE — DISCHARGE NOTE ADULT - PLAN OF CARE
prevention of recurrence sinus venous thrombosis  full dose lovenox injection BID and then bridge to coumadin with your PCP  follow up with PCP and vascular neurology- Dr. Mcfarland

## 2018-05-16 NOTE — PHYSICAL THERAPY INITIAL EVALUATION ADULT - ORIENTATION, REHAB EVAL
QUICK REFERENCE INFORMATION:  The ABCs of the Annual Wellness Visit    Subsequent Medicare Wellness Visit    HEALTH RISK ASSESSMENT    1946    Recent Hospitalizations:  Recently treated at the following:  [facility]        Current Medical Providers:  Patient Care Team:  Krystina Gee MD as PCP - General  Krystina Gee MD as PCP - Family Medicine  Krystina Gee MD as PCP - Claims Attributed        Smoking Status:  History   Smoking Status   • Former Smoker   • Packs/day: 2.00   • Years: 35.00   • Types: Cigarettes   Smokeless Tobacco   • Never Used     Comment: QUIT 20 PLUS YEARS AGO        Alcohol Consumption:  History   Alcohol Use No     Comment: stopped drinking       Depression Screen:   PHQ-9 Depression Screening 4/28/2017   Little interest or pleasure in doing things 0   Feeling down, depressed, or hopeless 0   Trouble falling or staying asleep, or sleeping too much 0   Feeling tired or having little energy 0   Poor appetite or overeating 0   Feeling bad about yourself - or that you are a failure or have let yourself or your family down 0   Trouble concentrating on things, such as reading the newspaper or watching television 0   Moving or speaking so slowly that other people could have noticed. Or the opposite - being so fidgety or restless that you have been moving around a lot more than usual 0   Thoughts that you would be better off dead, or of hurting yourself in some way 0   PHQ-9 Total Score 0   If you checked off any problems, how difficult have these problems made it for you to do your work, take care of things at home, or get along with other people? Not difficult at all       Health Habits and Functional and Cognitive Screening:  Functional & Cognitive Status 4/28/2017   Do you have difficulty preparing food and eating? No   Do you have difficulty bathing yourself? No   Do you have difficulty getting dressed? No   Do you have difficulty moving around from place to place?  No   In the past year have you fallen or experienced a near fall? No   Do you need help using the phone?  No   Are you deaf or do you have serious difficulty hearing?  No   Do you need help with transportation? No   Do you need help shopping? No   Do you need help preparing meals?  No   Do you need help with housework?  No   Do you need help with laundry? No   Do you need help taking your medications? No   Do you need help managing money? No   Do you have difficulty concentrating, remembering or making decisions? No       Health Habits  Current Diet: Well Balanced Diet  Dental Exam: Up to date  Eye Exam: Up to date  Exercise (times per week): 0 times per week  Current Exercise Activities Include: None      Does the patient have evidence of cognitive impairment? No    Aspirin use counseling: Start ASA 81 mg daily       Recent Lab Results:  CMP:  Lab Results   Component Value Date    BUN 26 (H) 03/08/2017    CREATININE 1.30 03/08/2017    EGFRIFNONA 54 (L) 03/08/2017    BCR 20.0 03/08/2017     03/08/2017    K 4.4 03/08/2017    CO2 30.0 03/08/2017    CALCIUM 8.7 03/08/2017    ALBUMIN 4.40 12/14/2016    LABIL2 1.5 12/14/2016    BILITOT 0.4 12/14/2016    ALKPHOS 73 12/14/2016    AST 42 (H) 12/14/2016    ALT 41 (H) 12/14/2016     Lipid Panel:  Lab Results   Component Value Date    CHLPL 125 06/06/2016    TRIG 113 12/14/2016    HDL 39 (L) 12/14/2016     HbA1c:  Lab Results   Component Value Date    HGBA1C 7.60 (H) 02/22/2017       Visual Acuity:  No exam data present    Age-appropriate Screening Schedule:  Refer to the list below for future screening recommendations based on patient's age, sex and/or medical conditions. Orders for these recommended tests are listed in the plan section. The patient has been provided with a written plan.    Health Maintenance   Topic Date Due   • ZOSTER VACCINE  04/30/2018 (Originally 6/6/2016)   • HEMOGLOBIN A1C  08/22/2017   • DIABETIC EYE EXAM  11/08/2017   • LIPID PANEL  12/14/2017    • DIABETIC FOOT EXAM  04/28/2018   • URINE MICROALBUMIN  04/28/2018   • COLONOSCOPY  01/08/2024   • INFLUENZA VACCINE  Addressed   • PNEUMOCOCCAL VACCINES (65+ LOW/MEDIUM RISK)  Completed   • TDAP/TD VACCINES  Excluded        Subjective   History of Present Illness    Gabriela Mar is a 70 y.o. male who presents for an Subsequent Wellness Visit.    The following portions of the patient's history were reviewed and updated as appropriate: allergies, current medications, past family history, past medical history, past social history, past surgical history and problem list.    Outpatient Medications Prior to Visit   Medication Sig Dispense Refill   • acetaminophen (TYLENOL) 500 MG tablet Take 500 mg by mouth Every 6 (Six) Hours As Needed for mild pain (1-3).     • amLODIPine (NORVASC) 5 MG tablet Take 1 tablet by mouth Daily. 90 tablet 1   • aspirin (ASPIRIN LOW DOSE) 81 MG chewable tablet Chew 1 tablet Daily. Resume in 1 month     • Cholecalciferol (VITAMIN D) 1000 UNITS tablet Take 1 capsule by mouth Daily.     • Cyanocobalamin (VITAMIN B-12 ER) 1000 MCG tablet controlled-release Take 1,000 mcg by mouth Daily.     • glimepiride (AMARYL) 4 MG tablet TAKE ONE TABLET BY MOUTH IN THE MORNING BEFORE BREAKFEST. 90 tablet 0   • lisinopril (PRINIVIL,ZESTRIL) 20 MG tablet TAKE ONE TABLET BY MOUTH TWICE DAILY 180 tablet 1   • metFORMIN (GLUCOPHAGE) 1000 MG tablet TAKE ONE AND ONE-HALF TABLETS BY MOUTH IN THE MORNING AND ONE IN THE EVENING (Patient taking differently: TAKE ONE AND ONE-HALF TABLETS BY MOUTH IN THE EVENING  AND ONE IN THE MORNING) 235 tablet 0   • Multiple Vitamins-Minerals (MULTI VITAMIN/MINERALS PO) Take 1 tablet by mouth Daily.     • omeprazole (PRILOSEC) 20 MG capsule Take 1 capsule by mouth Daily.     • simvastatin (ZOCOR) 10 MG tablet TAKE ONE TABLET BY MOUTH AT BEDTIME 90 tablet 0   • SITagliptin (JANUVIA) 100 MG tablet Take 1 tablet by mouth Daily. 90 tablet 1   • Insulin Glargine (TOUJEO SOLOSTAR) 300  UNIT/ML solution pen-injector Inject 10 Units under the skin every night.     • aspirin  MG EC tablet Take 1 tablet by mouth Daily. For 1 month 30 tablet 0   • docusate sodium (COLACE) 100 MG capsule Take 1 capsule by mouth 2 (Two) Times a Day. 60 capsule 0     No facility-administered medications prior to visit.        Patient Active Problem List   Diagnosis   • Hyperlipidemia   • Sciatica   • Hypertension   • Aortic valve disorder   • Esophageal reflux   • Obesity   • Osteoarthritis   • Type 2 diabetes mellitus, uncontrolled   • Vitamin D deficiency   • Vitamin B deficiency   • Hip arthritis   • Status post total replacement of right hip   • Acute blood loss anemia, mild, asymptomatic   • Leukocytosis, mild, likely reactive       Advance Care Planning:  has NO advance directive - not interested in additional information    Identification of Risk Factors:  Risk factors include: weight , cardiovascular risk and increased fall risk.    Review of Systems   Constitutional: Negative.  Negative for chills and fever.   HENT: Negative for ear discharge, ear pain, sinus pressure and sore throat.    Respiratory: Negative for cough, chest tightness and shortness of breath.    Cardiovascular: Negative for chest pain, palpitations and leg swelling.   Gastrointestinal: Negative for diarrhea, nausea and vomiting.   Genitourinary: Negative for dysuria, frequency and urgency.   Musculoskeletal: Positive for arthralgias (knee pain better). Negative for back pain and myalgias.   Neurological: Negative for dizziness, syncope and headaches.   Psychiatric/Behavioral: Negative for confusion and sleep disturbance.       Compared to one year ago, the patient feels his physical health is better.  Compared to one year ago, the patient feels his mental health is better.    Objective     Physical Exam   Constitutional: He is oriented to person, place, and time. He appears well-developed and well-nourished.   HENT:   Head: Normocephalic  "and atraumatic.   Right Ear: External ear normal.   Left Ear: External ear normal.   Mouth/Throat: No oropharyngeal exudate.   Eyes: Conjunctivae are normal. Pupils are equal, round, and reactive to light. No scleral icterus.   Neck: Neck supple. No thyromegaly present.   Cardiovascular: Normal rate, regular rhythm and intact distal pulses.  Exam reveals no friction rub.    No murmur heard.  Pulmonary/Chest: Effort normal and breath sounds normal. He has no wheezes. He has no rales.   Abdominal: Soft. Bowel sounds are normal. He exhibits no distension. There is no tenderness.   Musculoskeletal: He exhibits no edema.   Lymphadenopathy:     He has no cervical adenopathy.   Neurological: He is alert and oriented to person, place, and time. He displays normal reflexes. No cranial nerve deficit. He exhibits normal muscle tone. Coordination normal.   Skin: Skin is warm and dry. No rash noted. No erythema.   Psychiatric: He has a normal mood and affect. His behavior is normal. Judgment and thought content normal.   Nursing note and vitals reviewed.      Vitals:    04/28/17 1133   BP: 110/62   Pulse: 75   SpO2: 99%  Comment: ra   Weight: 211 lb 6.4 oz (95.9 kg)   Height: 70\" (177.8 cm)       Body mass index is 30.33 kg/(m^2).  Discussed the patient's BMI with him. The BMI is above average; BMI management plan is completed.    Assessment/Plan   Patient Self-Management and Personalized Health Advice  The patient has been provided with information about: diet, exercise, weight management, fall prevention and supplements and preventive services including:   · Advance directive, Counseling for cardiovascular disease risk reduction, Exercise counseling provided, Fall Risk plan of care done, Nutrition counseling provided, Pneumococcal vaccine , Zostavax vaccine (Herpes Zoster).    Visit Diagnoses:    ICD-10-CM ICD-9-CM   1. Medicare annual wellness visit, subsequent Z00.00 V70.0   2. Essential hypertension I10 401.9   3. Mixed " hyperlipidemia E78.2 272.2   4. Uncontrolled type 2 diabetes mellitus with other specified complication E11.69     E11.65    5. Acute blood loss anemia, mild, asymptomatic D62 285.1   6. H/O tobacco use, presenting hazards to health Z87.891 V15.82   7. Encounter for immunization Z23 V03.89       Orders Placed This Encounter   Procedures   • US AAA Screen Limited     Standing Status:   Future     Standing Expiration Date:   4/28/2018     Order Specific Question:   Reason for Exam:     Answer:   AAA screening, r/o AAA   • Pneumococcal Polysaccharide Vaccine 23-Valent Greater Than or Equal To 1yo Subcutaneous / IM   • POCT microalbumin       Outpatient Encounter Prescriptions as of 4/28/2017   Medication Sig Dispense Refill   • acetaminophen (TYLENOL) 500 MG tablet Take 500 mg by mouth Every 6 (Six) Hours As Needed for mild pain (1-3).     • amLODIPine (NORVASC) 5 MG tablet Take 1 tablet by mouth Daily. 90 tablet 1   • aspirin (ASPIRIN LOW DOSE) 81 MG chewable tablet Chew 1 tablet Daily. Resume in 1 month     • Cholecalciferol (VITAMIN D) 1000 UNITS tablet Take 1 capsule by mouth Daily.     • Cyanocobalamin (VITAMIN B-12 ER) 1000 MCG tablet controlled-release Take 1,000 mcg by mouth Daily.     • glimepiride (AMARYL) 4 MG tablet TAKE ONE TABLET BY MOUTH IN THE MORNING BEFORE BREAKFEST. 90 tablet 0   • lisinopril (PRINIVIL,ZESTRIL) 20 MG tablet TAKE ONE TABLET BY MOUTH TWICE DAILY 180 tablet 1   • metFORMIN (GLUCOPHAGE) 1000 MG tablet TAKE ONE AND ONE-HALF TABLETS BY MOUTH IN THE MORNING AND ONE IN THE EVENING (Patient taking differently: TAKE ONE AND ONE-HALF TABLETS BY MOUTH IN THE EVENING  AND ONE IN THE MORNING) 235 tablet 0   • Multiple Vitamins-Minerals (MULTI VITAMIN/MINERALS PO) Take 1 tablet by mouth Daily.     • omeprazole (PRILOSEC) 20 MG capsule Take 1 capsule by mouth Daily.     • simvastatin (ZOCOR) 10 MG tablet TAKE ONE TABLET BY MOUTH AT BEDTIME 90 tablet 0   • SITagliptin (JANUVIA) 100 MG tablet Take 1  tablet by mouth Daily. 90 tablet 1   • [DISCONTINUED] Insulin Glargine (TOUJEO SOLOSTAR) 300 UNIT/ML solution pen-injector Inject 10 Units under the skin every night.     • Insulin Glargine (BASAGLAR KWIKPEN) 100 UNIT/ML injection pen Inject 10 Units under the skin Every Night. 3 pen 5   • [DISCONTINUED] aspirin  MG EC tablet Take 1 tablet by mouth Daily. For 1 month 30 tablet 0   • [DISCONTINUED] docusate sodium (COLACE) 100 MG capsule Take 1 capsule by mouth 2 (Two) Times a Day. 60 capsule 0     No facility-administered encounter medications on file as of 4/28/2017.        Reviewed use of high risk medication in the elderly: yes  Reviewed for potential of harmful drug interactions in the elderly: yes    Follow Up:  Return in about 10 weeks (around 7/7/2017) for Recheck.     An After Visit Summary and PPPS with all of these plans were given to the patient.         oriented to person, place, time and situation

## 2018-05-16 NOTE — PHYSICAL THERAPY INITIAL EVALUATION ADULT - ADDITIONAL COMMENTS
Pt lives with spouse in basement apartment of home, 5 stairs to enter (+) HR, no steps within. Pt previously (I) with all mobility skills.

## 2018-05-16 NOTE — DISCHARGE NOTE ADULT - MEDICATION SUMMARY - MEDICATIONS TO TAKE
I will START or STAY ON the medications listed below when I get home from the hospital:    enoxaparin 80 mg/0.8 mL injectable solution  -- 70 milligram(s) subcutaneously 2 times a day   -- It is very important that you take or use this exactly as directed.  Do not skip doses or discontinue unless directed by your doctor.    -- Indication: For Sinus venosus defect I will START or STAY ON the medications listed below when I get home from the hospital:    enoxaparin 80 mg/0.8 mL injectable solution  -- 70 milligram(s) subcutaneously 2 times a day   -- It is very important that you take or use this exactly as directed.  Do not skip doses or discontinue unless directed by your doctor.    -- Indication: For Sinus venosus defect    B2-400 oral capsule  -- 1 cap(s) by mouth once a day   -- Indication: For Sinus venosus defect I will START or STAY ON the medications listed below when I get home from the hospital:    enoxaparin 80 mg/0.8 mL injectable solution  -- 70 milligram(s) subcutaneously 2 times a day   -- It is very important that you take or use this exactly as directed.  Do not skip doses or discontinue unless directed by your doctor.    -- Indication: For Sinus venosus defect    riboflavin 400 mg oral capsule  -- 1 cap(s) by mouth once a day   -- Indication: For Headache

## 2018-05-16 NOTE — CONSULT NOTE ADULT - SUBJECTIVE AND OBJECTIVE BOX
Patient is a 53y Female whom presented to the hospital with     PAST MEDICAL & SURGICAL HISTORY:  No pertinent past medical history  No pertinent past medical history  H/O: hysterectomy  No significant past surgical history      MEDICATIONS  (STANDING):  enoxaparin Injectable 70 milliGRAM(s) SubCutaneous two times a day  sodium chloride 0.9%. 1000 milliLiter(s) (75 mL/Hr) IV Continuous <Continuous>      Allergies    No Known Allergies    Intolerances        SOCIAL HISTORY:  Denies ETOh,Smoking,     FAMILY HISTORY:  No pertinent family history in first degree relatives      REVIEW OF SYSTEMS:    CONSTITUTIONAL: No weakness, fevers or chills  EYES/ENT: No visual changes;  no throat pain   NECK: No pain or stiffness  RESPIRATORY: No cough, wheezing, hemoptysis; No shortness of breath  CARDIOVASCULAR: No chest pain or palpitations  GASTROINTESTINAL: No abdominal or epigastric pain. No nausea, vomiting,     No diarrhea or constipation. No melena   GENITOURINARY: No dysuria, frequency or hematuria  NEUROLOGICAL: No numbness or weakness  SKIN: dry      VITAL:  T(C): , Max: 37.2 (05-15-18 @ 23:15)  T(F): , Max: 99 (05-15-18 @ 23:15)  HR: 56 (05-16-18 @ 15:12)  BP: 118/64 (05-16-18 @ 15:12)  BP(mean): --  RR: 18 (05-16-18 @ 12:33)  SpO2: 97% (05-16-18 @ 15:12)  Wt(kg): --    I and O's:    05-15 @ 07:01  -  05-16 @ 07:00  --------------------------------------------------------  IN: 360 mL / OUT: 0 mL / NET: 360 mL          PHYSICAL EXAM:    Constitutional: NAD  HEENT: conjunctive   clear   Neck:  No JVD  Respiratory: CTAB  Cardiovascular: S1 and S2  Gastrointestinal: BS+, soft, NT/ND  Extremities: No peripheral edema  Neurological: A/O x 3, no focal deficits  Psychiatric: Normal mood, normal affect  : No [place]  Skin: No rashes  Access: Not applicable    LABS:                        12.4   6.04  )-----------( 264      ( 16 May 2018 07:54 )             37.3     05-16    142  |  102  |  8   ----------------------------<  116<H>  3.4<L>   |  26  |  0.63    Ca    8.3<L>      16 May 2018 06:19        Urine Studies:          RADIOLOGY & ADDITIONAL STUDIES:

## 2018-05-16 NOTE — PROGRESS NOTE ADULT - ASSESSMENT
ASSESSMENT:   54 Y/O woman with no significant PMH transferred from [place] for abnormal CTH findings. She reports to have noticed HAs since about 5/10, described as holocephalic HAs associated with nausea and vomiting. She denies any significant history of HAs in the past. She denies any personal or family history of DVT/PEs or history of miscarriages in the past. She was transferred to [facility] for further management of abnormal CT brain findings. CT brain on admission showed hyperdensity involving left SSS, transverse sinus, sigmoid sinus, IJ and straight sinus. CTV head confirmed extensive cerebral venous sinus thrombosis involving posterior superior sagittal sinus, torcula, bilateral transverse sinuses (left worse than right), sigmoid sinus, IJ and straight sinus. NIHSS is 0 and MRS is 0.    NEURO: Continue close monitoring for neurologic deterioration, permissive HTN, titrate statin to LDL goal less than 70, MRI Brain w/o, MRA Head w/o and Neck w/contrast. Physical therapy/OT/Speech eval/treatment.     ANTITHROMBOTIC THERAPY:     PULMONARY: CXR clear, protecting airway, saturating well     CARDIOVASCULAR: check TTE, cardiac monitoring                              SBP goal:     GASTROINTESTINAL:  dysphagia screen       Diet:     RENAL: BUN/Cr stable, good urine output      Na Goal: Greater than 135     Rock:    HEMATOLOGY: H/H stable, Platelets normal      DVT ppx: Heparin s.c [] LMWH []     ID: afebrile, no leukocytosis     OTHER:     DISPOSITION: Rehab or home depending on PT eval once stable and workup is complete      CORE MEASURES:        Admission NIHSS:      TPA: [] YES [] NO      LDL/HDL:     Depression Screen:      Statin Therapy:     Dysphagia Screen: [] PASS [] FAIL     Smoking [] YES [] NO      Afib [] YES [] NO     Stroke Education [] YES [] NO ASSESSMENT:   54 Y/O woman with no significant PMH transferred from [place] for abnormal CTH findings. She reports to have noticed HAs since about 5/10, described as holocephalic HAs associated with nausea and vomiting. She denies any significant history of HAs in the past. She denies any personal or family history of DVT/PEs or history of miscarriages in the past. She was transferred to [facility] for further management of abnormal CT brain findings. CT brain on admission showed hyperdensity involving left SSS, transverse sinus, sigmoid sinus, IJ and straight sinus. CTV head confirmed extensive cerebral venous sinus thrombosis involving posterior superior sagittal sinus, torcula, bilateral transverse sinuses (left worse than right), sigmoid sinus, IJ and straight sinus. NIHSS is 0 and MRS is 0.    NEURO: Continue close monitoring for neurologic deterioration, LDL 80 no neurological indication for stain as no cerebral ischemia or atherosclerotic disease, MRI Brain w/o contrast ordered. Physical therapy/OT/Speech evaluation pending. Repeat MRI brain/MRV head with and without contrast in about 6 months.    ANTITHROMBOTIC THERAPY: Continue with therapeutic anticoagulation with full dose subq Lovenox and switching to warfarin with goal INR 2-3 as outpatient. Will contact PCP Dr. Mohsen Pahlavan [phone] to confirm outpatient follow up. Duration of therapeutic indicative patient is expected to be 6 months.     PULMONARY: NRD, protecting airway, saturating well     CARDIOVASCULAR: check TTE, cardiac monitoring                              SBP goal: BP goal<140/90 mmHg    GASTROINTESTINAL:  dysphagia screen passed      Diet: Regular    RENAL: BUN/Cr stable, good urine output      Na Goal: Greater than 135     Rock: No    HEMATOLOGY: H/H stable, Platelets normal; Mixed studies and hypercoagulable panel is ordered- consent signed and obtained.      DVT ppx: Lovenox     ID: afebrile, no leukocytosis noted    OTHER: Patient instructed importance of Lovenox to coumadin bridge for anticoagulation. All questions answered and understood. Patient will follow up with PCP and Neurology as outpatient.     DISPOSITION: PT evaluation pending, likely home      CORE MEASURES:        Admission NIHSS:      TPA: [] YES [] NO      LDL/HDL:     Depression Screen:      Statin Therapy:     Dysphagia Screen: [] PASS [] FAIL     Smoking [] YES [] NO      Afib [] YES [] NO     Stroke Education [] YES [] NO ASSESSMENT:   52 Y/O woman with no significant PMH transferred from [place] for abnormal CTH findings. She reports to have noticed HAs since about 5/10, described as holocephalic HAs associated with nausea and vomiting. She denies any significant history of HAs in the past. She denies any personal or family history of DVT/PEs or history of miscarriages in the past. She was transferred to [facility] for further management of abnormal CT brain findings. CT brain on admission showed hyperdensity involving left SSS, transverse sinus, sigmoid sinus, IJ and straight sinus. CTV head confirmed extensive cerebral venous sinus thrombosis involving posterior superior sagittal sinus, torcula, bilateral transverse sinuses (left worse than right), sigmoid sinus, IJ and straight sinus. NIHSS is 0 and MRS is 0.    NEURO: Continue close monitoring for neurologic deterioration, LDL 80 no neurological indication for stain as no cerebral ischemia or atherosclerotic disease, MRI Brain w/o contrast ordered. Physical therapy/OT/Speech evaluation pending. Repeat MRI brain/MRV head with and without contrast in about 6 months.    ANTITHROMBOTIC THERAPY: Continue with therapeutic anticoagulation with full dose subq Lovenox and switching to warfarin with goal INR 2-3 as outpatient. Will contact PCP Dr. Mohsen Pahlavan [phone] to confirm outpatient follow up. Duration of therapeutic indicative patient is expected to be 6 months.     PULMONARY: NRD, protecting airway, saturating well     CARDIOVASCULAR: check TTE, cardiac monitoring                              SBP goal: BP goal<140/90 mmHg    GASTROINTESTINAL:  dysphagia screen passed      Diet: Regular    RENAL: BUN/Cr stable, good urine output      Na Goal: Greater than 135     Rock: No    HEMATOLOGY: H/H stable, Platelets normal; Mixed studies and hypercoagulable panel is ordered- consent signed and obtained.      DVT ppx: Lovenox     ID: afebrile, no leukocytosis noted    OTHER: Patient instructed importance of Lovenox to coumadin bridge for anticoagulation. All questions answered and understood. Patient will follow up with PCP and Neurology as outpatient.     DISPOSITION: PT evaluation pending, likely home      CORE MEASURES:        Admission NIHSS: 0     TPA: [] YES [x] NO      LDL/HDL: 80     Depression Screen: p     Statin Therapy: no indication currently as there is no cerebral ischemia or atherosclerosis      Dysphagia Screen: [x] PASS [] FAIL     Smoking [] YES [x] NO      Afib [] YES [x] NO     Stroke Education [x] YES [] NO ASSESSMENT:   52 Y/O woman with no significant PMH transferred from [place] for abnormal CTH findings. She reports to have noticed HAs since about 5/10, described as holocephalic HAs associated with nausea and vomiting. She denies any significant history of HAs in the past. She denies any personal or family history of DVT/PEs or history of miscarriages in the past. She was transferred to [facility] for further management of abnormal CT brain findings. CT brain on admission showed hyperdensity involving left SSS, transverse sinus, sigmoid sinus, IJ and straight sinus. CTV head confirmed extensive cerebral venous sinus thrombosis involving posterior superior sagittal sinus, torcula, bilateral transverse sinuses (left worse than right), sigmoid sinus, IJ and straight sinus. NIHSS is 0 and MRS is 0.    NEURO: Continue close monitoring for neurologic deterioration, LDL 80 no neurological indication for stain as no cerebral ischemia or atherosclerotic disease, MRI Brain w/o contrast ordered. Physical therapy/OT/Speech evaluation pending. Advised to take tylenol for headaches and start Riboflavin daily, importance of overusing NSAIDs explained to patient as it might pose a risk of bleeding. Repeat MRI brain/MRV head with and without contrast in about 6 months.    ANTITHROMBOTIC THERAPY: Continue with therapeutic anticoagulation with full dose subq Lovenox and switching to warfarin with goal INR 2-3 as outpatient. Will contact PCP Dr. Mohsen Pahlavan [phone] to confirm outpatient follow up. Duration of therapeutic indicative patient is expected to be 6 months.     PULMONARY: NRD, protecting airway, saturating well     CARDIOVASCULAR: check TTE, cardiac monitoring                              SBP goal: BP goal<140/90 mmHg    GASTROINTESTINAL:  dysphagia screen passed      Diet: Regular    RENAL: BUN/Cr stable, good urine output      Na Goal: Greater than 135     Rock: No    HEMATOLOGY: H/H stable, Platelets normal; Mixed studies and hypercoagulable panel is ordered- consent signed and obtained.      DVT ppx: Lovenox     ID: afebrile, no leukocytosis noted    OTHER: Patient instructed importance of Lovenox to coumadin bridge for anticoagulation. All questions answered and understood. Patient will follow up with PCP and Neurology as outpatient.     DISPOSITION: PT evaluation pending, likely home      CORE MEASURES:        Admission NIHSS: 0     TPA: [] YES [x] NO      LDL/HDL: 80     Depression Screen: p     Statin Therapy: no indication currently as there is no cerebral ischemia or atherosclerosis      Dysphagia Screen: [x] PASS [] FAIL     Smoking [] YES [x] NO      Afib [] YES [x] NO     Stroke Education [x] YES [] NO ASSESSMENT:   54 Y/O woman with no significant PMH transferred from [place] for abnormal CTH findings. She reports to have noticed HAs since about 5/10, described as holocephalic HAs associated with nausea and vomiting. She denied any significant history of HAs in the past. She denied any personal or family history of DVT/PEs or history of miscarriages in the past. She was transferred to [facility] for further management of abnormal CT brain findings. CT brain on admission showed hyperdensity involving left SSS, transverse sinus, sigmoid sinus, IJ and straight sinus. CTV head confirmed extensive cerebral venous sinus thrombosis involving posterior superior sagittal sinus, torcula, bilateral transverse sinuses (left worse than right), sigmoid sinus, IJ and straight sinus. NIHSS is 0 and MRS is 0.    Impression:  Spontaneous/unprovoked extensive cerebral venous thrombosis    NEURO: neurologically without acute change, LDL 80 no neurological indication for stain as no cerebral ischemia or atherosclerotic disease, MRI Brain w/o contrast pending. Physical therapy/OT eval for home, assist as needed. Advised to take Tylenol for headaches and start Riboflavin daily, importance of overusing NSAIDs explained to patient as it might pose a risk of bleeding. Repeat MRI brain/MRV head with and without contrast in about 6 months.    ANTITHROMBOTIC THERAPY: Continue with therapeutic anticoagulation with full dose subq Lovenox and bridging to warfarin with goal INR 2-3 as outpatient. Team contacted PCP Dr. Mohsen Pahlavan [phone] to confirm outpatient follow up and plan. Duration of therapeutic indicative patient is expected to be 6 months. She should stop Lovenox sq when INR 2-3 and be dosed accordingly.      PULMONARY: NRD, protecting airway, saturating well     CARDIOVASCULAR:  cardiac monitoring                              SBP goal: BP goal<140/90 mmHg    GASTROINTESTINAL:  dysphagia screen passed, tolerating diet      Diet: Regular    RENAL: BUN/Cr without acute change, avoid dehydration, KCl for hypokalemia, good urine output      Na Goal: Greater than 135     Rock: No    HEMATOLOGY: H/H without acute change, Platelets 264; Mixed studies and hypercoagulable panel is ordered- consent signed and obtained.      DVT ppx: Lovenox     ID: afebrile, no leukocytosis noted    OTHER: Patient instructed importance of Lovenox to coumadin bridge for anticoagulation. All questions answered and understood. Patient will follow up with PCP and Neurology as outpatient. D/W friend Janay as requested.     DISPOSITION: Home, assist as needed.       CORE MEASURES:        Admission NIHSS: 0     TPA: [] YES [x] NO      LDL/HDL: 80     Depression Screen: 0     Statin Therapy: no indication currently as there is no cerebral ischemia or atherosclerosis      Dysphagia Screen: [x] PASS [] FAIL     Smoking [] YES [x] NO      Afib [] YES [x] NO     Stroke Education [x] YES [] NO ASSESSMENT:   52 Y/O woman with vascular risk factor of age is evaluated at [facility] for headaches. She reports to have noticed HAs since about 5/10, described as holocephalic HAs associated with nausea and vomiting. She denies any significant history of HAs in the past. She was initially presented to urgent care center and was referred to OSH for further evaluation. She was transferred to [facility] for further management of abnormal CT brain findings. CT brain on admission showed hyperdensity involving left SSS, transverse sinus, sigmoid sinus, IJ and straight sinus. CTV head confirmed extensive cerebral venous sinus thrombosis involving posterior superior sagittal sinus, torcula, bilateral transverse sinuses (left worse than right), sigmoid sinus, IJ and straight sinus. Of note, she denies any personal or family history of DVT/PEs or history of miscarriages in the past.     Impression:  Spontaneous/unprovoked extensive cerebral venous thrombosis    NEURO: neurologically without acute change, LDL 80 no neurological indication for stain as no cerebral ischemia or atherosclerotic disease, MRI Brain w/o contrast pending. Physical therapy/OT eval for home assist as needed. Advised to take Tylenol for headaches and start Riboflavin 400 mg daily, importance of avoiding NSAIDs explained to patient as it might pose a risk of bleeding. Patient requested to have MRI of brain with and without contrast performed as an outpatient through her PCP. Repeat MRI brain/MRV head with and without contrast in about 6 months.    ANTITHROMBOTIC THERAPY: Continue with therapeutic anticoagulation with subcutaneous Lovenox and consider bridging to warfarin with goal INR 2-3 as outpatient. Team contacted PCP Dr. Mohsen Pahlavan [phone] to confirm outpatient follow up and plan. Duration of therapeutic anticoagulation is expected to be 6 months considering likely a provoked/spontaneous cerebral venous sinus thrombosis. She should stop Lovenox sq when INR becomes therapeutic i.e. 2-3 and be dosed accordingly.      PULMONARY: NRD, protecting airway, saturating well     CARDIOVASCULAR: continue with cardiac monitoring                              SBP goal: BP goal<140/90 mmHg    GASTROINTESTINAL:  dysphagia screen passed, tolerating diet      Diet: Regular    RENAL: BUN/Cr without acute change, avoid dehydration, KCl for hypokalemia, good urine output      Na Goal: Greater than 135     Rock: No    HEMATOLOGY: H/H without acute change, Platelets 264; Mixed studies and hypercoagulable panel is ordered- consent signed and obtained.      DVT ppx: Lovenox     ID: afebrile, no leukocytosis noted    OTHER: Patient instructed importance of Lovenox to coumadin bridge for anticoagulation. All questions answered and understood. Patient will follow up with PCP and Neurology as outpatient. D/W friend Janay as requested. Consider hematologist evaluation as an outpatient to rule out underlying hypercoagulable state    DISPOSITION: Home, assist as needed.     CORE MEASURES:        Admission NIHSS: 0     TPA: [] YES [x] NO      LDL/HDL: 80     Depression Screen: 0     Statin Therapy: no indication currently as there is no cerebral ischemia or atherosclerosis      Dysphagia Screen: [x] PASS [] FAIL     Smoking [] YES [x] NO      Afib [] YES [x] NO     Stroke Education [x] YES [] NO

## 2018-05-16 NOTE — DISCHARGE NOTE ADULT - HOSPITAL COURSE
52 Y/O woman with no significant PMH transferred from [place] for abnormal CTH findings. She reports to have noticed HAs since about 5/10, described as holocephalic HAs associated with nausea and vomiting. She denies any significant history of HAs in the past. She denies any personal or family history of DVT/PEs or history of miscarriages in the past. She was transferred to [facility] for further management of abnormal CT brain findings. CT brain on admission showed hyperdensity involving left SSS, transverse sinus, sigmoid sinus, IJ and straight sinus. CTV head confirmed extensive cerebral venous sinus thrombosis involving posterior superior sagittal sinus, torcula, bilateral transverse sinuses (left worse than right), sigmoid sinus, IJ and straight sinus. NIHSS is 0 and MRS is 0.    Patient was put on full dose lovenox (70mg BID) and will be bridged to coumadin for anticoagulation when she goes to see her PCP, Dr. Mohsen Pahlavan. Will have MRI brain w/ and w/o contrast today and discharged home after. 52 Y/O woman with no significant PMH transferred from [place] for abnormal CTH findings. She reports to have noticed HAs since about 5/10, described as holocephalic HAs associated with nausea and vomiting. She denies any significant history of HAs in the past. She denies any personal or family history of DVT/PEs or history of miscarriages in the past. She was transferred to [facility] for further management of abnormal CT brain findings. CT brain on admission showed hyperdensity involving left SSS, transverse sinus, sigmoid sinus, IJ and straight sinus. CTV head confirmed extensive cerebral venous sinus thrombosis involving posterior superior sagittal sinus, torcula, bilateral transverse sinuses (left worse than right), sigmoid sinus, IJ and straight sinus. NIHSS is 0 and MRS is 0.    Patient was put on full dose lovenox (70mg BID) and will be bridged to coumadin for anticoagulation when she goes to see her PCP, Dr. Mohsen Pahlavan. INR goal of 2-3, once coumadin therapeutic, can discontinue full dose lovenox. Will have MRI brain w/ and w/o contrast today and discharged home after. 54 Y/O woman with no significant PMH transferred from [place] for abnormal CTH findings. She reports to have noticed HAs since about 5/10, described as holocephalic HAs associated with nausea and vomiting. She denies any significant history of HAs in the past. She denies any personal or family history of DVT/PEs or history of miscarriages in the past. She was transferred to [facility] for further management of abnormal CT brain findings. CT brain on admission showed hyperdensity involving left SSS, transverse sinus, sigmoid sinus, IJ and straight sinus. CTV head confirmed extensive cerebral venous sinus thrombosis involving posterior superior sagittal sinus, torcula, bilateral transverse sinuses (left worse than right), sigmoid sinus, IJ and straight sinus. NIHSS is 0 and MRS is 0.    Patient was put on full dose lovenox (70mg BID) and will be bridged to coumadin for anticoagulation when she goes to see her PCP, Dr. Mohsen Pahlavan. INR goal of 2-3, once coumadin therapeutic, can discontinue full dose lovenox. Will have MRI brain w/ and w/o contrast today and discharged home after. Patient to follow up anticoagulation panel outpatient 54 Y/O woman with no significant PMH transferred from [place] for abnormal CTH findings. She reports to have noticed HAs since about 5/10, described as holocephalic HAs associated with nausea and vomiting. She denies any significant history of HAs in the past. She denies any personal or family history of DVT/PEs or history of miscarriages in the past. She was transferred to [facility] for further management of abnormal CT brain findings. CT brain on admission showed hyperdensity involving left SSS, transverse sinus, sigmoid sinus, IJ and straight sinus. CTV head confirmed extensive cerebral venous sinus thrombosis involving posterior superior sagittal sinus, torcula, bilateral transverse sinuses (left worse than right), sigmoid sinus, IJ and straight sinus. NIHSS is 0 and MRS is 0.    Patient was put on full dose lovenox (70mg BID) and will be bridged to coumadin for anticoagulation when she goes to see her PCP, Dr. Mohsen Pahlavan. INR goal of 2-3, once coumadin therapeutic, can discontinue full dose lovenox. Patient can have MRI brain w/ and w/o contrast as outpatient. Patient to follow up anticoagulation panel outpatient

## 2018-05-16 NOTE — CONSULT NOTE ADULT - PROBLEM SELECTOR RECOMMENDATION 2
No acute neurosurgical intervention  s/p  Keppra 500BID, load with 1g   CTH / CTV   Neurology eval if positive for venous sinus thrombosis   - Neurochecks

## 2018-05-16 NOTE — DISCHARGE NOTE ADULT - PATIENT PORTAL LINK FT
You can access the [facility] Patient Portal, offered by [facility], by registering with the following website: [email or web address]

## 2018-05-16 NOTE — DISCHARGE NOTE ADULT - COMMUNITY RESOURCES
[facility] [phone] WILL HAVE LOVENOX MEDICATION FOR PICKUP. [facility] [phone] WILL HAVE LOVENOX MEDICATION FOR PICKUP TODAY.

## 2018-05-16 NOTE — DISCHARGE NOTE ADULT - CARE PLAN
Principal Discharge DX:	Sinus venosus defect  Goal:	prevention of recurrence  Assessment and plan of treatment:	sinus venous thrombosis  full dose lovenox injection BID and then bridge to coumadin with your PCP  follow up with PCP and vascular neurology- Dr. Mcfarland

## 2018-05-16 NOTE — DISCHARGE NOTE ADULT - HOME CARE AGENCY
[facility] [phone] FOR VN. VISITING NURSE WILL CALL TO SCHEDULE VISIT DAY AFTER DISCHARGE.

## 2018-05-17 PROBLEM — Z00.00 ENCOUNTER FOR PREVENTIVE HEALTH EXAMINATION: Status: ACTIVE | Noted: 2018-05-17

## 2018-05-18 LAB
APCR PPP: 2.68 RATIO — SIGNIFICANT CHANGE UP
DNA PLOIDY SPEC FC-IMP: SIGNIFICANT CHANGE UP
MTHFR GENE INTERPRETATION: SIGNIFICANT CHANGE UP
PTR INTERPRETATION: SIGNIFICANT CHANGE UP

## 2018-05-22 ENCOUNTER — APPOINTMENT (OUTPATIENT)
Dept: NEUROLOGY | Facility: CLINIC | Age: 53
End: 2018-05-22
Payer: MEDICAID

## 2018-05-22 VITALS
WEIGHT: 160 LBS | DIASTOLIC BLOOD PRESSURE: 79 MMHG | SYSTOLIC BLOOD PRESSURE: 127 MMHG | HEART RATE: 74 BPM | HEIGHT: 61.5 IN | BODY MASS INDEX: 29.82 KG/M2

## 2018-05-22 DIAGNOSIS — Z78.9 OTHER SPECIFIED HEALTH STATUS: ICD-10-CM

## 2018-05-22 PROCEDURE — 99215 OFFICE O/P EST HI 40 MIN: CPT

## 2018-05-23 PROCEDURE — 86146 BETA-2 GLYCOPROTEIN ANTIBODY: CPT

## 2018-05-23 PROCEDURE — 85610 PROTHROMBIN TIME: CPT

## 2018-05-23 PROCEDURE — 85027 COMPLETE CBC AUTOMATED: CPT

## 2018-05-23 PROCEDURE — 96375 TX/PRO/DX INJ NEW DRUG ADDON: CPT | Mod: XU

## 2018-05-23 PROCEDURE — 99285 EMERGENCY DEPT VISIT HI MDM: CPT | Mod: 25

## 2018-05-23 PROCEDURE — 80053 COMPREHEN METABOLIC PANEL: CPT

## 2018-05-23 PROCEDURE — 97161 PT EVAL LOW COMPLEX 20 MIN: CPT

## 2018-05-23 PROCEDURE — 81240 F2 GENE: CPT

## 2018-05-23 PROCEDURE — 85384 FIBRINOGEN ACTIVITY: CPT

## 2018-05-23 PROCEDURE — 86900 BLOOD TYPING SEROLOGIC ABO: CPT

## 2018-05-23 PROCEDURE — 96374 THER/PROPH/DIAG INJ IV PUSH: CPT | Mod: XU

## 2018-05-23 PROCEDURE — 80048 BASIC METABOLIC PNL TOTAL CA: CPT

## 2018-05-23 PROCEDURE — 86901 BLOOD TYPING SEROLOGIC RH(D): CPT

## 2018-05-23 PROCEDURE — 80061 LIPID PANEL: CPT

## 2018-05-23 PROCEDURE — 86850 RBC ANTIBODY SCREEN: CPT

## 2018-05-23 PROCEDURE — 81241 F5 GENE: CPT

## 2018-05-23 PROCEDURE — 70496 CT ANGIOGRAPHY HEAD: CPT

## 2018-05-23 PROCEDURE — 85732 THROMBOPLASTIN TIME PARTIAL: CPT

## 2018-05-23 PROCEDURE — 70450 CT HEAD/BRAIN W/O DYE: CPT

## 2018-05-23 PROCEDURE — 85730 THROMBOPLASTIN TIME PARTIAL: CPT

## 2018-05-23 PROCEDURE — 86147 CARDIOLIPIN ANTIBODY EA IG: CPT

## 2018-05-23 PROCEDURE — 85307 ASSAY ACTIVATED PROTEIN C: CPT

## 2018-05-23 PROCEDURE — 85303 CLOT INHIBIT PROT C ACTIVITY: CPT

## 2018-05-23 PROCEDURE — 83090 ASSAY OF HOMOCYSTEINE: CPT

## 2018-05-23 PROCEDURE — 85300 ANTITHROMBIN III ACTIVITY: CPT

## 2018-05-23 PROCEDURE — 85306 CLOT INHIBIT PROT S FREE: CPT

## 2018-05-23 PROCEDURE — 85635 REPTILASE TEST: CPT

## 2018-05-23 PROCEDURE — 85611 PROTHROMBIN TEST: CPT

## 2018-05-23 PROCEDURE — 81291 MTHFR GENE: CPT

## 2018-05-23 PROCEDURE — 83036 HEMOGLOBIN GLYCOSYLATED A1C: CPT

## 2018-05-23 PROCEDURE — 84702 CHORIONIC GONADOTROPIN TEST: CPT

## 2018-05-23 PROCEDURE — 85670 THROMBIN TIME PLASMA: CPT

## 2018-05-24 ENCOUNTER — OTHER (OUTPATIENT)
Age: 53
End: 2018-05-24

## 2018-05-29 ENCOUNTER — OTHER (OUTPATIENT)
Age: 53
End: 2018-05-29

## 2018-06-05 ENCOUNTER — APPOINTMENT (OUTPATIENT)
Dept: MRI IMAGING | Facility: CLINIC | Age: 53
End: 2018-06-05

## 2018-07-08 ENCOUNTER — OUTPATIENT (OUTPATIENT)
Dept: OUTPATIENT SERVICES | Facility: HOSPITAL | Age: 53
LOS: 1 days | Discharge: HOME | End: 2018-07-08

## 2018-07-08 DIAGNOSIS — Z90.710 ACQUIRED ABSENCE OF BOTH CERVIX AND UTERUS: Chronic | ICD-10-CM

## 2018-07-08 DIAGNOSIS — I74.9 EMBOLISM AND THROMBOSIS OF UNSPECIFIED ARTERY: ICD-10-CM

## 2018-07-11 ENCOUNTER — LABORATORY RESULT (OUTPATIENT)
Age: 53
End: 2018-07-11

## 2018-07-11 ENCOUNTER — RESULT REVIEW (OUTPATIENT)
Age: 53
End: 2018-07-11

## 2018-07-11 ENCOUNTER — APPOINTMENT (OUTPATIENT)
Dept: HEMATOLOGY ONCOLOGY | Facility: CLINIC | Age: 53
End: 2018-07-11
Payer: MEDICAID

## 2018-07-11 VITALS
BODY MASS INDEX: 29.67 KG/M2 | HEIGHT: 61.46 IN | DIASTOLIC BLOOD PRESSURE: 75 MMHG | RESPIRATION RATE: 16 BRPM | WEIGHT: 159.17 LBS | OXYGEN SATURATION: 95 % | HEART RATE: 72 BPM | TEMPERATURE: 98 F | SYSTOLIC BLOOD PRESSURE: 119 MMHG

## 2018-07-11 DIAGNOSIS — Z80.42 FAMILY HISTORY OF MALIGNANT NEOPLASM OF PROSTATE: ICD-10-CM

## 2018-07-11 DIAGNOSIS — Z78.9 OTHER SPECIFIED HEALTH STATUS: ICD-10-CM

## 2018-07-11 DIAGNOSIS — Z80.1 FAMILY HISTORY OF MALIGNANT NEOPLASM OF TRACHEA, BRONCHUS AND LUNG: ICD-10-CM

## 2018-07-11 DIAGNOSIS — Z80.3 FAMILY HISTORY OF MALIGNANT NEOPLASM OF BREAST: ICD-10-CM

## 2018-07-11 LAB
BASOPHILS # BLD AUTO: 0 K/UL — SIGNIFICANT CHANGE UP (ref 0–0.2)
BASOPHILS NFR BLD AUTO: 0.4 % — SIGNIFICANT CHANGE UP (ref 0–2)
EOSINOPHIL # BLD AUTO: 0.1 K/UL — SIGNIFICANT CHANGE UP (ref 0–0.5)
EOSINOPHIL NFR BLD AUTO: 2.3 % — SIGNIFICANT CHANGE UP (ref 0–6)
HCT VFR BLD CALC: 41.7 % — SIGNIFICANT CHANGE UP (ref 34.5–45)
HGB BLD-MCNC: 13.9 G/DL — SIGNIFICANT CHANGE UP (ref 11.5–15.5)
LYMPHOCYTES # BLD AUTO: 2.5 K/UL — SIGNIFICANT CHANGE UP (ref 1–3.3)
LYMPHOCYTES # BLD AUTO: 42 % — SIGNIFICANT CHANGE UP (ref 13–44)
MCHC RBC-ENTMCNC: 30 PG — SIGNIFICANT CHANGE UP (ref 27–34)
MCHC RBC-ENTMCNC: 33.2 G/DL — SIGNIFICANT CHANGE UP (ref 32–36)
MCV RBC AUTO: 90.3 FL — SIGNIFICANT CHANGE UP (ref 80–100)
MONOCYTES # BLD AUTO: 0.4 K/UL — SIGNIFICANT CHANGE UP (ref 0–0.9)
MONOCYTES NFR BLD AUTO: 5.9 % — SIGNIFICANT CHANGE UP (ref 2–14)
NEUTROPHILS # BLD AUTO: 2.9 K/UL — SIGNIFICANT CHANGE UP (ref 1.8–7.4)
NEUTROPHILS NFR BLD AUTO: 49.4 % — SIGNIFICANT CHANGE UP (ref 43–77)
OB PNL STL: NEGATIVE — SIGNIFICANT CHANGE UP
PLATELET # BLD AUTO: 275 K/UL — SIGNIFICANT CHANGE UP (ref 150–400)
RBC # BLD: 4.62 M/UL — SIGNIFICANT CHANGE UP (ref 3.8–5.2)
RBC # FLD: 12 % — SIGNIFICANT CHANGE UP (ref 10.3–14.5)
WBC # BLD: 5.9 K/UL — SIGNIFICANT CHANGE UP (ref 3.8–10.5)
WBC # FLD AUTO: 5.9 K/UL — SIGNIFICANT CHANGE UP (ref 3.8–10.5)

## 2018-07-11 PROCEDURE — 99205 OFFICE O/P NEW HI 60 MIN: CPT

## 2018-07-20 ENCOUNTER — OTHER (OUTPATIENT)
Age: 53
End: 2018-07-20

## 2018-10-09 ENCOUNTER — APPOINTMENT (OUTPATIENT)
Dept: NEUROLOGY | Facility: CLINIC | Age: 53
End: 2018-10-09

## 2018-10-09 ENCOUNTER — APPOINTMENT (OUTPATIENT)
Dept: NEUROLOGY | Facility: CLINIC | Age: 53
End: 2018-10-09
Payer: MEDICAID

## 2018-10-09 VITALS
BODY MASS INDEX: 30.38 KG/M2 | HEIGHT: 61.5 IN | WEIGHT: 163 LBS | SYSTOLIC BLOOD PRESSURE: 122 MMHG | HEART RATE: 71 BPM | DIASTOLIC BLOOD PRESSURE: 74 MMHG

## 2018-10-09 PROCEDURE — 99215 OFFICE O/P EST HI 40 MIN: CPT

## 2018-10-09 RX ORDER — ENOXAPARIN SODIUM 80 MG/.8ML
80 INJECTION SUBCUTANEOUS
Refills: 0 | Status: DISCONTINUED | COMMUNITY
End: 2018-10-09

## 2018-10-10 ENCOUNTER — RESULT CHARGE (OUTPATIENT)
Age: 53
End: 2018-10-10

## 2018-10-10 LAB
BUN SERPL-MCNC: 12 MG/DL
CREAT SERPL-MCNC: 0.92 MG/DL

## 2018-10-11 LAB
B2 GLYCOPROT1 IGA SERPL IA-ACNC: 16.6 SAU
B2 GLYCOPROT1 IGG SER-ACNC: <5 SGU
B2 GLYCOPROT1 IGM SER-ACNC: 5.5 SMU
CARDIOLIPIN AB SER IA-ACNC: NEGATIVE
CARDIOLIPIN IGM SER-MCNC: 10.3 MPL
CARDIOLIPIN IGM SER-MCNC: <5 GPL
CONFIRM: 37.9 SEC
DEPRECATED CARDIOLIPIN IGA SER: <5 APL
DRVVT 1H NP PPP: 34.2 SEC
DRVVT IMM 1:2 NP PPP: NORMAL
DRVVT SCREEN TO CONFIRM RATIO: 1.01 RATIO
SCREEN DRVVT: 47.1 SEC

## 2018-12-05 ENCOUNTER — OTHER (OUTPATIENT)
Age: 53
End: 2018-12-05

## 2018-12-16 ENCOUNTER — FORM ENCOUNTER (OUTPATIENT)
Age: 53
End: 2018-12-16

## 2018-12-17 ENCOUNTER — APPOINTMENT (OUTPATIENT)
Dept: CT IMAGING | Facility: CLINIC | Age: 53
End: 2018-12-17
Payer: MEDICAID

## 2018-12-17 ENCOUNTER — OUTPATIENT (OUTPATIENT)
Dept: OUTPATIENT SERVICES | Facility: HOSPITAL | Age: 53
LOS: 1 days | End: 2018-12-17
Payer: MEDICAID

## 2018-12-17 DIAGNOSIS — G08 INTRACRANIAL AND INTRASPINAL PHLEBITIS AND THROMBOPHLEBITIS: ICD-10-CM

## 2018-12-17 DIAGNOSIS — Z90.710 ACQUIRED ABSENCE OF BOTH CERVIX AND UTERUS: Chronic | ICD-10-CM

## 2018-12-17 PROCEDURE — 70496 CT ANGIOGRAPHY HEAD: CPT

## 2018-12-17 PROCEDURE — 70496 CT ANGIOGRAPHY HEAD: CPT | Mod: 26

## 2018-12-18 PROBLEM — Z80.3 FAMILY HISTORY OF MALIGNANT NEOPLASM OF FEMALE BREAST: Status: ACTIVE | Noted: 2018-12-18

## 2018-12-18 PROBLEM — Z78.9 GRAVIDA 1 PARA 1: Status: RESOLVED | Noted: 2018-12-18 | Resolved: 2018-12-18

## 2018-12-18 PROBLEM — Z80.42 FAMILY HISTORY OF MALIGNANT NEOPLASM OF PROSTATE: Status: ACTIVE | Noted: 2018-12-18

## 2018-12-18 PROBLEM — Z80.1 FAMILY HISTORY OF LUNG CANCER: Status: ACTIVE | Noted: 2018-12-18

## 2019-01-14 ENCOUNTER — APPOINTMENT (OUTPATIENT)
Dept: NEUROLOGY | Facility: CLINIC | Age: 54
End: 2019-01-14
Payer: MEDICAID

## 2019-01-14 VITALS
SYSTOLIC BLOOD PRESSURE: 117 MMHG | WEIGHT: 169 LBS | DIASTOLIC BLOOD PRESSURE: 76 MMHG | HEART RATE: 70 BPM | HEIGHT: 61.5 IN | BODY MASS INDEX: 31.5 KG/M2

## 2019-01-14 PROCEDURE — 99215 OFFICE O/P EST HI 40 MIN: CPT

## 2019-01-14 RX ORDER — WARFARIN 1 MG/1
1 TABLET ORAL
Qty: 30 | Refills: 0 | Status: DISCONTINUED | COMMUNITY
Start: 2018-10-09 | End: 2019-01-14

## 2019-01-14 RX ORDER — ASPIRIN 81 MG
81 TABLET, DELAYED RELEASE (ENTERIC COATED) ORAL DAILY
Refills: 0 | Status: ACTIVE | COMMUNITY

## 2019-02-15 NOTE — ASSESSMENT
[FreeTextEntry1] : Assessment:\par 52 Y/O L handed woman with vascular risk factor of age is seen in the vascular neurology office for the evaluation and management of headache, leading to hospital admission in 5/18. She reports to have noticed HAs since about 5/10/18 associated with nausea and vomiting without any associated photo or phonophobia. She denies any focal neurological symptoms associated with headache. She initially presented to urgent care center and was referred to [facility]. She was transferred to [facility] subsequently of abnormal CT brain findings. CT brain on admission showed hyperdensity involving left SSS, transverse sinus, sigmoid sinus, IJV and straight sinus. CTV head with contrast confirmed extensive cerebral venous sinus thrombosis involving posterior superior sagittal sinus, torcula, bilateral transverse sinuses (left worse than right), sigmoid sinus, IJ and straight sinus. Hypercoagulable workup is grossly unrevealing to my eye. She was treated with therapeutic anticoagulation for about 6-8 months. CTV head in 12/18 showed significant improvement in the CVST now with hypoplastic right transverse sinus vs. chronic CVST. Of note, during the CT chest, abdomen and pelvis with and without contrast did not show any evidence of metastatic malignancy but showed complex large ovarian cysts - thought to be benign as per verbal report from the patient. \par \par Impression:\par Extensive cerebral venous thrombosis - likely etiology being spontaneous/unprovoked CVST \par \par Plan:\par - Aspirin 81 mg once a day due to history of unprovoked CVST (completed more than 6 moths of therapeutic anticoagulation with warfarin), indefinitely if not contraindicated due to specific reasons in the future \par - Decision regarding indications to be on statin therapy is deferred to her PCP, based on medical indications/ASCVD risk profile\par - She should follow up closely with her primary care physician for optimal vascular risk factors modifications including blood pressure goal less than 130/80 mmHg, HbA1c goal <7 and preferably 6-6.5 and optimal cholesterol management \par - She is advised to check blood pressure regularly at home, preferably at different times during the day and multiple days a week and was advised to keep a log of BPs to bring to primary care physician visit for further instructions regarding optimal BP management. Also advised to followup closely with PCP regarding regular blood work including monitoring of HbA1c and cholesterol profile\par - Continue to followup with hematologist as scheduled\par - Advised to consult/follow up with her PCP regarding age and risk factors appropriate screening for malignancy\par - Advised to continue to follow up with ophthalmologist as scheduled\par - Advised to avoid common provocative factors for CVST like dehydration, head and neck infection or head injury or head/neck infections etc.\par - She is currently being evaluated to undergo gynecological surgery for complex large ovarian cysts. No absolute neurovascular contraindication to proceed with surgery as indicated from gynecological standpoint. Optimally it would be beneficial for the patient to continue antiplatelet therapy throughout the oh-procedural period. If antiplatelet therapy needs to be stopped/withheld, it should be withheld for shortness duration of time possible in the oh-procedure period and should be re-started as soon as possible/safe from the procedural standpoint. Increased risk of thromboembolic complications like stroke or CVST during the period antiplatelet therapy is withheld, is discussed with patient in detail. Advised to maintain hydration perioperative and during operative procedure.\par \par - Above mentioned plan was discussed with patient and available family member/friend in detail. I have answered all their questions to the best of my abilities. I have reviewed measures for secondary stroke prevention with the patient and available family members, including aggressive vascular risk factors modification, healthy diet, regular exercise and medication compliance. As well as discussed the need for calling 911 immediately in case of any symptoms concerning for stroke in the future. \par - I would follow her in the office in about 4 months or earlier as needed. Advised her to give me a call once blood work and imaging studies have been performed to discuss the results over the phone.

## 2019-02-15 NOTE — PHYSICAL EXAM
[FreeTextEntry1] : General exam: Sitting in the chair and does not appear to be in distress\par Carotid bruits: None\par CVS: S1-S2 present\par RS: CTA B\par Skin: No lesion noted on visible skin \par \par Neuro exam: \par MS: Alert, awake and is oriented to time, place and person with normal attention span, normal recent and remote memory\par Language: Fluent speech with intact comprehension, with intact naming and repetition, no right-left confusion, no finger agnosia and no apraxia. Normal fund of knowledge. No dysarthria. \par Cr.N.: Pupils bilaterally 3-4 mm in size, equal, round and reactive to light, no papilledema, intact visual fields to confrontation, extraocular movements intact without any diplopia, no ptosis, no nystagmus, face appears to be symmetric with intact facial sensations, no hearing loss to rubbing fingers, tongue is in the midline, uvula elevates in the midline and without any drooping of the soft palate, normal shrugging of the shoulders bilaterally.\par \par Motor: \par Tone - Normal\par Bulk - No atrophy\par Power - 5/5 all over without any pronator drift\par DTRs - +1 all over and bilaterally symmetric\par Plantars: Bilaterally flexor\par Sensory: Intact to light touch and pinprick, no sensory extinction. \par Cerebellar: No ataxia on finger-nose-finger and knee-heel-shin testing, as well as without any dysdiadochokinesia\par Gait: Normal casual gait with normal stride and length and was able to perform toe, heel and tandem walking\par Romberg's sign - Absent.

## 2019-02-15 NOTE — HISTORY OF PRESENT ILLNESS
[FreeTextEntry1] : 52 Y/O L handed woman with vascular risk factor of age is seen in the vascular neurology office for the evaluation and management of headache, leading to hospital admission in 5/18.\par \par She reports to have noticed HAs since about 5/10/18, described as holocephalic HAs but predominantly bifrontally, associated with nausea and vomiting. She denies any associated photo or phonophobia. She denies any focal neurological symptoms associated with headache. Her HAs is described to be constant and about 8/10 in intensity. She denies any significant history of HAs in the past. She was initially presented to urgent care center and was referred to [facility] for further evaluation. She was transferred to [facility] for further management of abnormal CT brain findings. She was diagnosed to have CVST and started on therapeutic anticoagulation and was discharged. She denies any personal or family history of DVT/PEs. She denies any family history of stroke at young age. She denies any severe dehydration, head injury or head/neck infection/URTI before the onset of headache. \par \par Currently she denies any HAs. Denies any episodes of new or recurrent focal neurological symptoms concerning for stroke/TIA or ICH since her last office visit. She has been off of warfarin and switched to aspirin in 12/18. Reports compliance with her medications and denies any significant side effects. CT chest, abdomen and pelvis showed complex cyst in the left adnexa without any evidence of metastatic disease and cyst in the left breast, which was noted to be benign on mammogram. \par \par ROS: All negative except documented in the history of present illness.\par \par Home medications:\par Reviewed with the patient/available family member and updated as appropriate.

## 2019-05-20 ENCOUNTER — APPOINTMENT (OUTPATIENT)
Dept: NEUROLOGY | Facility: CLINIC | Age: 54
End: 2019-05-20
Payer: MEDICAID

## 2019-05-20 VITALS
SYSTOLIC BLOOD PRESSURE: 110 MMHG | BODY MASS INDEX: 30.19 KG/M2 | WEIGHT: 162 LBS | HEART RATE: 63 BPM | DIASTOLIC BLOOD PRESSURE: 64 MMHG | HEIGHT: 61.5 IN

## 2019-05-20 PROCEDURE — 99215 OFFICE O/P EST HI 40 MIN: CPT

## 2019-05-30 NOTE — HISTORY OF PRESENT ILLNESS
[FreeTextEntry1] : 53 Y/O L handed woman with vascular risk factor of age is seen in the vascular neurology office for the evaluation and management of headache, leading to hospital admission in 5/18.\par \par She reports to have noticed HAs since about 5/10/18, described as holocephalic HAs but predominantly bifrontally, associated with nausea and vomiting. She denies any associated photo or phonophobia. She denies any focal neurological symptoms associated with headache. Her HAs is described to be constant and about 8/10 in intensity. She denies any significant history of HAs in the past. She was initially presented to urgent care center and was referred to [facility] for further evaluation. She was transferred to [facility] for further management of abnormal CT brain findings. She was diagnosed to have CVST and started on therapeutic anticoagulation and was discharged. She denies any personal or family history of DVT/PEs. She denies any family history of stroke at young age. She denies any severe dehydration, head injury or head/neck infection/URTI before the onset of headache. She underwent surgical removal of ovarian cyst removal - [facility]. She tolerated the procedure well. \par \par Currently she denies any HAs. Denies any episodes of new or recurrent focal neurological symptoms concerning for stroke/TIA or ICH since her last office visit. Reports compliance with her medications and denies any significant side effects. \par \par ROS: All negative except documented in the history of present illness.\par \par Home medications:\par Reviewed with the patient/available family member and updated as appropriate.

## 2019-05-30 NOTE — ASSESSMENT
[FreeTextEntry1] : Assessment:\par 52 Y/O L handed woman with vascular risk factor of age is seen in the vascular neurology office for the evaluation and management of headache, leading to hospital admission in 5/18. She reports to have noticed HAs since about 5/10/18 associated with nausea and vomiting without any associated photo or phonophobia. She denies any focal neurological symptoms associated with headache. She initially presented to urgent care center and was referred to [facility]. She was transferred to [facility] subsequently of abnormal CT brain findings. CT brain on admission showed hyperdensity involving left SSS, transverse sinus, sigmoid sinus, IJV and straight sinus. CTV head with contrast confirmed extensive cerebral venous sinus thrombosis involving posterior superior sagittal sinus, torcula, bilateral transverse sinuses (left worse than right), sigmoid sinus, IJ and straight sinus. Hypercoagulable workup is grossly unrevealing to my eye. She was treated with therapeutic anticoagulation for about 6-8 months. CTV head in 12/18 showed significant improvement in the CVST now with hypoplastic right transverse sinus vs. chronic CVST. Of note, during the CT chest, abdomen and pelvis with and without contrast did not show any evidence of metastatic malignancy but showed complex large ovarian cysts - she underwent BANDAR with BSO in 2/2019 and tolerated the procedure well and as per verbal report from the patient, pathology did not show any evidence of malignancy. \par \par Impression:\par Extensive cerebral venous thrombosis - likely etiology being spontaneous/unprovoked CVST \par \par Plan:\par - Aspirin 81 mg once a day due to history of unprovoked CVST (completed more than 6 moths of therapeutic anticoagulation with warfarin), indefinitely if not contraindicated due to specific reasons in the future \par - Decision regarding indications to be on statin therapy is deferred to her PCP, based on medical indications/ASCVD risk profile\par - She should follow up closely with her primary care physician for optimal vascular risk factors modifications including blood pressure goal less than 130/80 mmHg, HbA1c goal <7 and preferably 6-6.5 and optimal cholesterol management \par - She is advised to check blood pressure regularly at home, preferably at different times during the day and multiple days a week and was advised to keep a log of BPs to bring to primary care physician visit for further instructions regarding optimal BP management. Also advised to followup closely with PCP regarding regular blood work including monitoring of HbA1c and cholesterol profile\par - Continue to followup with hematologist as scheduled/indicated\par - Advised to consult/follow up with her PCP regarding age and risk factors appropriate screening for malignancy - scheduled to have colonoscopy in the near future \par - Advised to continue to follow up with ophthalmologist as scheduled \par - Follow up with OB-GYN as scheduled and to discuss the final pathology results of BANDAR/BSO \par - Advised to avoid common provocative factors for CVST like dehydration, head and neck infection or head injury or head/neck infections etc.\par \par - Above mentioned plan was discussed with patient in detail. I have answered all her questions to the best of my abilities. I have reviewed measures for secondary stroke prevention with the patient, including aggressive vascular risk factors modification, healthy diet, regular exercise and medication compliance. As well as discussed the need for calling 911 immediately in case of any symptoms concerning for stroke in the future. \par - I would follow her in the office in about 12 months or earlier as needed. Advised her to give me a call once blood work and imaging studies have been performed to discuss the results over the phone.

## 2020-05-18 ENCOUNTER — APPOINTMENT (OUTPATIENT)
Dept: NEUROLOGY | Facility: CLINIC | Age: 55
End: 2020-05-18

## 2021-04-27 NOTE — ED ADULT NURSE NOTE - OBJECTIVE STATEMENT
Called patient to schedule a screening mammogram PATIENTPHONEMESSAGE: left message.-     Additional information      54 y/o female AXOX3 received ambulatory c/o headache associated with nausea and vomiting. report pain 6/10 frontal describe it has aching. since last night. vomiting x 3 yesterday. took Tylenol at 6:00am without relief. At this time denies any blurry vision, dizziness  fever, chills, nausea. neuro check intact. will monitor support and safety provided. 52 y/o female AXOX3 received ambulatory c/o headache associated with nausea and vomiting. report pain 6/10 frontal describe it to be aching and sharp.. since last night. vomiting x 3 yesterday. took Tylenol at 6:00am without relief. At this time denies any blurry vision, dizziness  fever, chills, nausea. neuro check intact. will monitor support and safety provided.

## 2021-06-29 ENCOUNTER — APPOINTMENT (OUTPATIENT)
Dept: CARDIOLOGY | Facility: CLINIC | Age: 56
End: 2021-06-29
Payer: MEDICAID

## 2021-06-29 ENCOUNTER — NON-APPOINTMENT (OUTPATIENT)
Age: 56
End: 2021-06-29

## 2021-06-29 VITALS
OXYGEN SATURATION: 97 % | WEIGHT: 164 LBS | BODY MASS INDEX: 30.96 KG/M2 | SYSTOLIC BLOOD PRESSURE: 109 MMHG | HEART RATE: 71 BPM | HEIGHT: 61 IN | DIASTOLIC BLOOD PRESSURE: 72 MMHG

## 2021-06-29 DIAGNOSIS — E66.3 OVERWEIGHT: ICD-10-CM

## 2021-06-29 DIAGNOSIS — G08 INTRACRANIAL AND INTRASPINAL PHLEBITIS AND THROMBOPHLEBITIS: ICD-10-CM

## 2021-06-29 DIAGNOSIS — Z13.6 ENCOUNTER FOR SCREENING FOR CARDIOVASCULAR DISORDERS: ICD-10-CM

## 2021-06-29 DIAGNOSIS — I44.0 ATRIOVENTRICULAR BLOCK, FIRST DEGREE: ICD-10-CM

## 2021-06-29 PROCEDURE — 93000 ELECTROCARDIOGRAM COMPLETE: CPT

## 2021-06-29 PROCEDURE — 99204 OFFICE O/P NEW MOD 45 MIN: CPT

## 2021-06-29 NOTE — REVIEW OF SYSTEMS
[SOB] : no shortness of breath [Dyspnea on exertion] : not dyspnea during exertion [Chest Discomfort] : no chest discomfort [Lower Ext Edema] : no extremity edema [Palpitations] : no palpitations [Syncope] : no syncope [Negative] : Heme/Lymph

## 2021-06-29 NOTE — HISTORY OF PRESENT ILLNESS
[FreeTextEntry1] : Jacki Kaye is a 56-year-old woman with a history of headache, spontaneous cerebral venous sinus thrombosis (previously completed more than 6 months of anticoagulation with warfarin -- now taking aspirin) who presents for cardiology consultation.  She has no past history of heart disease - referred by her primary care physician for screening.  She describes an excellent baseline exercise tolerance / capacity -- Works as a ; walks regularly for exercise (walked for approximately 2 hours last night with her ).  She has not been experiencing angina, dyspnea, palpitations, syncope, or change in her exercise tolerance.  She has no personal history of hypertension, diabetes, or hypercholesterolemia.  There is no family history of heart disease.  She feels well - no complaints offered during today's visit.

## 2021-06-29 NOTE — DISCUSSION/SUMMARY
[With Me] : with me [___ Year(s)] : in [unfilled] year(s) [FreeTextEntry1] : \par Screening for heart disease: No symptoms suggesting cardiovascular disease in the setting of an excellent baseline functional status and exercise capacity.  She reports recently normal lipid panel and the absence of diabetes mellitus (although no laboratory data is available for my review during today's visit).\par \par First-degree AV block: Today his 12-lead ECG demonstrated prolonged VT interval, consistent with first degree AV block; no intervention appropriate at this time.\par \par Overweight: BMI approximately 30 -- modest weight loss and continued exercise are both important.\par \par Cerebral venous sinus thrombosis: There is a history of past cerebral venous sinus thrombosis and she completed a course of anticoagulation with warfarin - now taking aspirin 81 mg daily as recommended to her by her neurologist.

## 2021-06-29 NOTE — PHYSICAL EXAM
[Well Developed] : well developed [Normal S1, S2] : normal S1, S2 [Clear Lung Fields] : clear lung fields [Soft] : abdomen soft [Non Tender] : non-tender [Normal Gait] : normal gait [No Edema] : no edema [No Rash] : no rash [Moves all extremities] : moves all extremities [Alert and Oriented] : alert and oriented [de-identified] : Overweight [de-identified] : Pupils are round [de-identified] : Wearing a face mask [de-identified] : No JVD

## 2022-11-23 NOTE — ED PROVIDER NOTE - NS ED ATTENDING STATEMENT MOD
Spine appears normal, movement of extremities grossly intact.
I have personally seen and examined this patient.  I have fully participated in the care of this patient. I have reviewed all pertinent clinical information, including history, physical exam, plan and the Resident’s note and agree except as noted.

## 2024-12-08 NOTE — ED PROVIDER NOTE - NS ED MD DISPO ADMIT [facility] UNIT
Additional Notes: Rx cortisone patient has at home bid for 1 week than qd for 1 week
Detail Level: Simple
stated
MED/SURG